# Patient Record
Sex: FEMALE | Race: WHITE | NOT HISPANIC OR LATINO | Employment: FULL TIME | URBAN - METROPOLITAN AREA
[De-identification: names, ages, dates, MRNs, and addresses within clinical notes are randomized per-mention and may not be internally consistent; named-entity substitution may affect disease eponyms.]

---

## 2017-01-12 ENCOUNTER — ALLSCRIPTS OFFICE VISIT (OUTPATIENT)
Dept: OTHER | Facility: OTHER | Age: 52
End: 2017-01-12

## 2017-07-21 DIAGNOSIS — Z12.31 ENCOUNTER FOR SCREENING MAMMOGRAM FOR MALIGNANT NEOPLASM OF BREAST: ICD-10-CM

## 2017-12-28 ENCOUNTER — GENERIC CONVERSION - ENCOUNTER (OUTPATIENT)
Dept: OTHER | Facility: OTHER | Age: 52
End: 2017-12-28

## 2018-01-05 ENCOUNTER — ALLSCRIPTS OFFICE VISIT (OUTPATIENT)
Dept: OTHER | Facility: OTHER | Age: 53
End: 2018-01-05

## 2018-01-05 ENCOUNTER — APPOINTMENT (OUTPATIENT)
Dept: LAB | Facility: HOSPITAL | Age: 53
End: 2018-01-05
Attending: OBSTETRICS & GYNECOLOGY
Payer: COMMERCIAL

## 2018-01-05 DIAGNOSIS — Z12.4 ENCOUNTER FOR SCREENING FOR MALIGNANT NEOPLASM OF CERVIX: ICD-10-CM

## 2018-01-05 DIAGNOSIS — Z12.31 ENCOUNTER FOR SCREENING MAMMOGRAM FOR MALIGNANT NEOPLASM OF BREAST: ICD-10-CM

## 2018-01-05 DIAGNOSIS — J30.9 ALLERGIC RHINITIS: ICD-10-CM

## 2018-01-05 DIAGNOSIS — Z00.00 ENCOUNTER FOR GENERAL ADULT MEDICAL EXAMINATION WITHOUT ABNORMAL FINDINGS: ICD-10-CM

## 2018-01-05 DIAGNOSIS — Z12.11 ENCOUNTER FOR SCREENING FOR MALIGNANT NEOPLASM OF COLON: ICD-10-CM

## 2018-01-05 DIAGNOSIS — J06.9 ACUTE UPPER RESPIRATORY INFECTION: ICD-10-CM

## 2018-01-05 PROCEDURE — G0145 SCR C/V CYTO,THINLAYER,RESCR: HCPCS

## 2018-01-05 PROCEDURE — 87624 HPV HI-RISK TYP POOLED RSLT: CPT

## 2018-01-06 ENCOUNTER — APPOINTMENT (OUTPATIENT)
Dept: LAB | Facility: MEDICAL CENTER | Age: 53
End: 2018-01-06
Payer: COMMERCIAL

## 2018-01-06 ENCOUNTER — TRANSCRIBE ORDERS (OUTPATIENT)
Dept: ADMINISTRATIVE | Facility: HOSPITAL | Age: 53
End: 2018-01-06

## 2018-01-06 DIAGNOSIS — Z00.00 ENCOUNTER FOR GENERAL ADULT MEDICAL EXAMINATION WITHOUT ABNORMAL FINDINGS: ICD-10-CM

## 2018-01-06 LAB
CHOLEST SERPL-MCNC: 183 MG/DL (ref 50–200)
ERYTHROCYTE [DISTWIDTH] IN BLOOD BY AUTOMATED COUNT: 12.3 % (ref 11.6–15.1)
EST. AVERAGE GLUCOSE BLD GHB EST-MCNC: 103 MG/DL
GLUCOSE P FAST SERPL-MCNC: 87 MG/DL (ref 65–99)
HBA1C MFR BLD: 5.2 % (ref 4.2–6.3)
HCT VFR BLD AUTO: 43.3 % (ref 34.8–46.1)
HDLC SERPL-MCNC: 57 MG/DL (ref 40–60)
HGB BLD-MCNC: 14.6 G/DL (ref 11.5–15.4)
LDLC SERPL CALC-MCNC: 115 MG/DL (ref 0–100)
MCH RBC QN AUTO: 32.4 PG (ref 26.8–34.3)
MCHC RBC AUTO-ENTMCNC: 33.7 G/DL (ref 31.4–37.4)
MCV RBC AUTO: 96 FL (ref 82–98)
PLATELET # BLD AUTO: 249 THOUSANDS/UL (ref 149–390)
PMV BLD AUTO: 9.7 FL (ref 8.9–12.7)
RBC # BLD AUTO: 4.51 MILLION/UL (ref 3.81–5.12)
TRIGL SERPL-MCNC: 55 MG/DL
TSH SERPL DL<=0.05 MIU/L-ACNC: 2.5 UIU/ML (ref 0.36–3.74)
WBC # BLD AUTO: 4.58 THOUSAND/UL (ref 4.31–10.16)

## 2018-01-06 PROCEDURE — 80061 LIPID PANEL: CPT

## 2018-01-06 PROCEDURE — 84443 ASSAY THYROID STIM HORMONE: CPT

## 2018-01-06 PROCEDURE — 85027 COMPLETE CBC AUTOMATED: CPT

## 2018-01-06 PROCEDURE — 82947 ASSAY GLUCOSE BLOOD QUANT: CPT

## 2018-01-06 PROCEDURE — 36415 COLL VENOUS BLD VENIPUNCTURE: CPT

## 2018-01-06 PROCEDURE — 83036 HEMOGLOBIN GLYCOSYLATED A1C: CPT

## 2018-01-06 NOTE — PROGRESS NOTES
Assessment   1  Encounter for screening mammogram for malignant neoplasm of breast (V76 12)     (Z12 31)   2  Encounter for preventive health examination (V70 0) (Z00 00)   3  Acute upper respiratory infection (465 9) (J06 9)   4  Allergic rhinitis (477 9) (J30 9)   5  Colon cancer screening (V76 51) (Z12 11)   6  History of self breast exam    Plan   Acute upper respiratory infection, Allergic rhinitis, Colon cancer screening, Health    Maintenance, Encounter for screening mammogram for malignant neoplasm of breast,    History of self breast exam    · MAMMO SCREENING BILATERAL W 3D & CAD; Status:Hold For - Scheduling; Requested    for:2018; Perform:[facility]; TUE:[id number];CPBMXLX; For:Acute upper respiratory infection, Allergic rhinitis, Colon cancer screening, Health Maintenance, Encounter for screening mammogram for malignant neoplasm of breast, History of self breast exam; Ordered By:Jennifer Abdi;  Health Maintenance    · (1) CBC/ PLT (NO DIFF); Status:Active; Requested ZRN:[id number]; Perform:[facility]; [id number];YEXLPCG;EDV:HLRDRW Maintenance; Ordered By:Foerign Abdi;   · (1) GLUCOSE,  FASTING; Status:Active; Requested BFT:[id number]; Perform:[facility]; PAC:[id number];VBOSRWB;FOL:LMFPCD Maintenance; Ordered By:Jennifer Abdi;   · (1) HEMOGLOBIN A1C; Status:Active; Requested GXV:[id number]; Perform:[facility]; KVY:[id number];FVVLGRI;IDI:XEYEEI Maintenance; Ordered By:Foreign Abdi;   · (1) LIPID PANEL, FASTING; Status:Active; Requested XNP:[id number]; Perform:[facility]; NFU:[id number];PJKXYZZ;ISM:XBMDPA Maintenance; Ordered By:Jennifer Abdi;   · (1) TSH; Status:Active; Requested OCL:[id number]; Perform:[facility]; PDH:63NVG7967;DUEJMZD;VAU:FZHMAY Maintenance; Ordered By:Jennifer Abdi;  Pap smear for cervical cancer screening    · (1) THIN PREP PAP WITH IMAGING; Status:Active - Retrospective Authorization;     Requested HCY:[id number]; Perform:[facility]; MNP:[id number]; Last Updated Zoraida Rodriguez; 1/5/2018 9:44:35 AM;Ordered; For:Pap smear for cervical cancer screening; Ordered By:Jennifer Abdi;  Maturation index required? : No  HPV? : Regardless of Interpretation  Perimenopausal symptoms    · Progesterone Micronized 100 MG Oral Capsule; One daily at bedtime   Rx By: Michelle Carranza; Dispense: 30 Days ; #:90 Capsule; Refill: 3;For: Perimenopausal symptoms; ANETA = N; Verified Transmission to [id number]; Last Updated By: System, SureScripts; 1/5/2018 9:43:59 AM  PMH: Hot flashes    · Estradiol 0 025 MG/24HR Transdermal Patch Weekly (Climara); APPLY 1 PATCH    WEEKLY AS DIRECTED   Rx By: Michelle Carranza; Dispense: 0 Days ; #:12 Patch Weekly; Refill: 3;For: PMH: Hot flashes; ANETA = N; Verified Transmission to [id number] Last Updated By: System, SureScripts; 1/5/2018 9:44:00 AM    Discussion/Summary   HPV and Pap Co-testing Done Today Breast cancer screening: the risks and benefits of breast cancer screening were discussed, self breast exam technique was taught, monthly self breast exam was advised and mammogram has been ordered  Advice and education were given regarding aerobic exercise and weight bearing exercise  Preventive labs ordered   symptoms-she will continue HRT, aware of the importance of taking both the estrogen patch and the Prometrium daily  will keep menstrual calendar   diet and exercise and weight loss, encouraged on her weight loss  The patient has the current Goals: See discussion  The patent has the current Barriers: None  Patient is able to Self-Care  Possible side effects of new medications were reviewed with the patient/guardian today  The treatment plan was reviewed with the patient/guardian  The patient/guardian understands and agrees with the treatment plan      Chief Complaint   1   Visit For: Preventive General Multisystem Exam    History of Present Illness   HPI: Patient here for yearly  She got  in November  She is on a low dose Climara patch and Prometrium for vasomotor symptoms  She is technically still karl menopausal  She skips menstrual cycles, her last 1 was 3 months ago  She still has some hot flashes but overall feels well on the HRT  She has been exercising and watching her diet and has lost about 25 lb in the last year, she is frustrated by help slow the weight is coming off  She would like her thyroid Re checked and she is due for preventive labs also  GYN HM, Adult Female [facility]: The patient is being seen for a health maintenance evaluation  The last health maintenance visit was 1 year(s) ago  General Health: The patient's health since the last visit is described as good  Lifestyle:  She has weight concerns  -- She exercises regularly  -- She does not use tobacco     Screening:      Review of Systems        Constitutional: No fever, no chills, feels well, no tiredness, no recent weight gain or loss  ENT: no ear ache, no loss of hearing, no nosebleeds or nasal discharge, no sore throat or hoarseness  Cardiovascular: no complaints of slow or fast heart rate, no chest pain, no palpitations, no leg claudication or lower extremity edema  Respiratory: no complaints of shortness of breath, no wheezing, no dyspnea on exertion, no orthopnea or PND  Breasts: no complaints of breast pain, breast lump or nipple discharge  Gastrointestinal: no complaints of abdominal pain, no constipation, no nausea or diarrhea, no vomiting, no bloody stools  Genitourinary: no complaints of dysuria, no incontinence, no pelvic pain, no dysmenorrhea, no vaginal discharge or abnormal vaginal bleeding  Musculoskeletal: no complaints of arthralgia, no myalgia, no joint swelling or stiffness, no limb pain or swelling  Integumentary: no complaints of skin rash or lesion, no itching or dry skin, no skin wounds        Neurological: no complaints of headache, no confusion, no numbness or tingling, no dizziness or fainting  ROS reviewed  Active Problems   1  Acute upper respiratory infection (465 9) (J06 9)   2  Allergic rhinitis (477 9) (J30 9)   3  Colon cancer screening (V76 51) (Z12 11)   4  Encounter for screening mammogram for malignant neoplasm of breast (V76 12)     (Z12 31)   5  History of self breast exam   6  Perimenopausal symptoms (627 2) (N95 1)    Past Medical History    · History of  0 (V49 89)   · History of amenorrhea (V13 29) (Z87 42)     The active problems and past medical history were reviewed and updated today  Surgical History    · History of Oral Surgery Tooth Extraction   · History of Tonsillectomy     The surgical history was reviewed and updated today  Family History   Father    · Family history of cardiac disorder (V17 49) (Z82 49)   · Family history of myocardial infarction (V17 3) (Z82 49)  Maternal Grandmother    · Family history of arthritis (V17 7) (Z82 61)   · Family history of cardiac disorder (V17 49) (Z82 49)     The family history was reviewed and updated today  Social History    · Caffeine use (V49 89) (F15 90)   · [employer] ([address] 61)   · Does not use birth control   · Denied: History of Drug Use   · Former smoker (E29 53) (K31 271)   · Denied: History of    · Single   · Social alcohol use (Z78 9)   · Uses Safety Equipment - Seatbelts  The social history was reviewed and updated today  Current Meds    1  Estradiol 0 025 MG/24HR Transdermal Patch Weekly; APPLY 1 PATCH WEEKLY AS     DIRECTED; Therapy: 57XYM2971 to (Last Rx:2017)  Requested for: 34RUP9443; Status:     ACTIVE - Renewal Denied Ordered   2  Progesterone Micronized 100 MG Oral Capsule; One daily at bedtime; Therapy: 61XIA1815 to (Evaluate:2017)  Requested for: 98FVH1174; Last     Rx:2017 Ordered    Allergies   1   No Known Drug Allergies    Vitals    Recorded: 40VWL4643 74:44PC   Systolic 361, LUE, Sitting   Diastolic 70, LUE, Sitting   Height 6 ft    Weight 207 lb    BMI Calculated 28 07   BSA Calculated 2 16   LMP THREE MONTHS AGO     Physical Exam        Constitutional      General appearance: No acute distress, well appearing and well nourished  Neck      Thyroid: Normal, no thyromegaly  Pulmonary      Auscultation of lungs: Clear to auscultation  Cardiovascular      Auscultation of heart: Normal rate and rhythm, normal S1 and S2, no murmurs  Genitourinary      External genitalia: Normal and no lesions appreciated  Vagina: Normal, no lesions or dryness appreciated  Urethra: Normal        Urethral meatus: Normal        Bladder: Normal, soft, non-tender and no prolapse or masses appreciated  Cervix: Normal, no palpable masses  Uterus: Normal, non-tender, not enlarged, and no palpable masses  Adnexa/parametria: Normal, non-tender and no fullness or masses appreciated  Anus, perineum, and rectum: Normal sphincter tone, no masses, and no prolapse  Chest      Breasts: Normal and no dimpling or skin changes noted  Abdomen      Abdomen: Normal, non-tender, and no organomegaly noted  Liver and spleen: No hepatomegaly or splenomegaly  Examination for hernias: No hernias appreciated  Psychiatric      Orientation to person, place, and time: Normal        Mood and affect: Normal        Signatures    Electronically signed by :  Stephen Vargas DO; Jan 5 2018  4:12PM EST                       (Author)

## 2018-01-09 ENCOUNTER — GENERIC CONVERSION - ENCOUNTER (OUTPATIENT)
Dept: OTHER | Facility: OTHER | Age: 53
End: 2018-01-09

## 2018-01-09 LAB
HPV RRNA GENITAL QL NAA+PROBE: NORMAL
LAB AP GYN PRIMARY INTERPRETATION: NORMAL
Lab: NORMAL

## 2018-01-10 ENCOUNTER — GENERIC CONVERSION - ENCOUNTER (OUTPATIENT)
Dept: OTHER | Facility: OTHER | Age: 53
End: 2018-01-10

## 2018-01-13 VITALS
DIASTOLIC BLOOD PRESSURE: 60 MMHG | OXYGEN SATURATION: 94 % | HEIGHT: 72 IN | SYSTOLIC BLOOD PRESSURE: 110 MMHG | HEART RATE: 65 BPM

## 2018-01-15 NOTE — MISCELLANEOUS
Message  Return to work or school:   Paul Ansari is under my professional care   She was seen in my office on 1/22/2016             Signatures   Electronically signed by : Bravo Arthur, ; Jan 22 2016  3:35PM EST                       (Author)

## 2018-01-17 NOTE — MISCELLANEOUS
Message  Message Free Text Note Form: pt requests refill on progesterone, sent in for 1 month supply and asked to call the office for further refills  Plan    1  Progesterone Micronized 100 MG Oral Capsule;  One daily at bedtime    Signatures   Electronically signed by : CELSA Wilcox ; Aug 19 2016  5:35PM EST                       (Author)

## 2018-01-23 VITALS
HEIGHT: 72 IN | WEIGHT: 207 LBS | SYSTOLIC BLOOD PRESSURE: 106 MMHG | BODY MASS INDEX: 28.04 KG/M2 | DIASTOLIC BLOOD PRESSURE: 70 MMHG

## 2018-01-23 NOTE — MISCELLANEOUS
Message   Recorded as Task   Date: 01/08/2018 03:21 PM, Created By: Aravind Wolf   Task Name: Go to Result   Assigned To: Yareli Ruelas   Regarding Patient: Laura Solis, Status: Active   Comment:    Aravind Wolf - 08 Jan 2018 3:21 PM     TASK CREATED  labs are good, CBC, TSH, fasting glucose, Hgb A1c all normal, LDL cholesterol slightly elevated, remainder of lipids are good   Gisel Hayes - 09 Jan 2018 9:17 AM     TASK EDITED  pt informed        Active Problems    1  Acute upper respiratory infection (465 9) (J06 9)   2  Allergic rhinitis (477 9) (J30 9)   3  Colon cancer screening (V76 51) (Z12 11)   4  Encounter for screening mammogram for malignant neoplasm of breast (V76 12)   (Z12 31)   5  History of self breast exam   6  Pap smear for cervical cancer screening (V76 2) (Z12 4)   7  Perimenopausal symptoms (627 2) (N95 1)    Current Meds   1  Estradiol 0 025 MG/24HR Transdermal Patch Weekly (Climara); APPLY 1 PATCH   WEEKLY AS DIRECTED; Therapy: 60GQX9903 to (Last Rx:05Jan2018)  Requested for: 92PLV1459 Ordered   2  Progesterone Micronized 100 MG Oral Capsule; One daily at bedtime; Therapy: 80XMU0119 to (Inessa Moore)  Requested for: 71GYA6237; Last   Rx:05Jan2018 Ordered    Allergies    1   No Known Drug Allergies    Signatures   Electronically signed by : Maricel Lopez, ; Jan 9 2018  9:18AM EST                       (Author)

## 2018-01-23 NOTE — MISCELLANEOUS
Message   Recorded as Task   Date: 12/28/2017 02:36 PM, Created By: Melinda Huber   Task Name: Call Back   Assigned To: Melinda Huber   Regarding Patient: Jacob Clemente, Status: Active   CommentGenebrittani Eppster - 28 Dec 2017 2:36 PM     TASK CREATED  Received phone call from Mona at Dr Louisa Grimm office asking me to call patient at [phone] and inform her if she received a Yellow Fever vaccine in 2010 when she was [facility] for [facility]  Called patient, no answer, I left a VM stating she never received the yellow fever vaccine and she could call [phone] with any questions  Active Problems    1  Acute upper respiratory infection (465 9) (J06 9)   2  Allergic rhinitis (477 9) (J30 9)   3  Colon cancer screening (V76 51) (Z12 11)   4  Encounter for screening mammogram for malignant neoplasm of breast (V76 12)   (Z12 31)   5  Perimenopausal symptoms (627 2) (N95 1)    Current Meds   1  Benzonatate 200 MG Oral Capsule; TAKE 1 CAPSULE Every 8 hours; Therapy: 17CAT7345 to (Evaluate:22Jan2017)  Requested for: 29BOH9406; Last   Rx:12Jan2017 Ordered   2  Estradiol 0 025 MG/24HR Transdermal Patch Weekly (Climara); APPLY 1 PATCH   WEEKLY AS DIRECTED; Therapy: 31VXB3853 to (Last Rx:12Oct2017)  Requested for: 12Oct2017; Status: ACTIVE   - Renewal Denied Ordered   3  Progesterone Micronized 100 MG Oral Capsule; One daily at bedtime; Therapy: 83RIG4418 to (Evaluate:10Nov2017)  Requested for: 72OYI6844; Last   Rx:12Oct2017 Ordered    Allergies    1  No Known Drug Allergies    Signatures   Electronically signed by :  Jaswinder Carrera, ; Dec 28 2017  2:37PM EST                       (Author)

## 2018-01-23 NOTE — MISCELLANEOUS
Message   Recorded as Task   Date: 01/09/2018 02:46 PM, Created By: Dahiana Johnson   Task Name: Go to Result   Assigned To: Joel Jacobo   Regarding Patient: Gerardo Zamudio, Status: Active   Comment:    Dahiana Johnson - 09 Jan 2018 2:46 PM     TASK CREATED  nml pap, neg hpv   Gisel Hayes - 10 [address] 2018 7:13 AM     TASK EDITED  normal card mailed        Active Problems    1  Acute upper respiratory infection (465 9) (J06 9)   2  Allergic rhinitis (477 9) (J30 9)   3  Colon cancer screening (V76 51) (Z12 11)   4  Encounter for screening mammogram for malignant neoplasm of breast (V76 12)   (Z12 31)   5  History of self breast exam   6  Pap smear for cervical cancer screening (V76 2) (Z12 4)   7  Perimenopausal symptoms (627 2) (N95 1)    Current Meds   1  Estradiol 0 025 MG/24HR Transdermal Patch Weekly (Climara); APPLY 1 PATCH   WEEKLY AS DIRECTED; Therapy: 22OJJ2597 to (Last Rx:05Jan2018)  Requested for: 90DLX3374 Ordered   2  Progesterone Micronized 100 MG Oral Capsule; One daily at bedtime; Therapy: 52FOS0665 to (Kate Friedman)  Requested for: 79EVD5332; Last   Rx:05Jan2018 Ordered    Allergies    1   No Known Drug Allergies    Signatures   Electronically signed by : Dandre Wakefield, ; Stanton 10 2018  7:14AM EST                       (Author)

## 2018-01-30 ENCOUNTER — OFFICE VISIT (OUTPATIENT)
Dept: INTERNAL MEDICINE CLINIC | Facility: CLINIC | Age: 53
End: 2018-01-30
Payer: COMMERCIAL

## 2018-01-30 VITALS
BODY MASS INDEX: 28.04 KG/M2 | HEART RATE: 79 BPM | DIASTOLIC BLOOD PRESSURE: 80 MMHG | SYSTOLIC BLOOD PRESSURE: 116 MMHG | WEIGHT: 207 LBS | OXYGEN SATURATION: 97 % | HEIGHT: 72 IN

## 2018-01-30 DIAGNOSIS — Z12.11 COLON CANCER SCREENING: Primary | ICD-10-CM

## 2018-01-30 DIAGNOSIS — M54.50 ACUTE MIDLINE LOW BACK PAIN WITHOUT SCIATICA: ICD-10-CM

## 2018-01-30 DIAGNOSIS — E88.81 INSULIN RESISTANCE: ICD-10-CM

## 2018-01-30 DIAGNOSIS — Z86.39 HISTORY OF BEING OBESE: ICD-10-CM

## 2018-01-30 PROCEDURE — 99214 OFFICE O/P EST MOD 30 MIN: CPT | Performed by: INTERNAL MEDICINE

## 2018-01-30 RX ORDER — METFORMIN HYDROCHLORIDE EXTENDED-RELEASE TABLETS 500 MG/1
TABLET, FILM COATED, EXTENDED RELEASE ORAL
Qty: 30 TABLET | Refills: 0 | Status: SHIPPED | OUTPATIENT
Start: 2018-01-30 | End: 2018-01-30 | Stop reason: SDUPTHER

## 2018-01-30 RX ORDER — METFORMIN HYDROCHLORIDE EXTENDED-RELEASE TABLETS 500 MG/1
TABLET, FILM COATED, EXTENDED RELEASE ORAL
Qty: 30 TABLET | Refills: 0 | Status: SHIPPED | OUTPATIENT
Start: 2018-01-30 | End: 2018-02-14

## 2018-01-30 NOTE — LETTER
January 30, 2018     Patient: Michel Han   YOB: 1965   Date of Visit: 1/30/2018       To Whom it May Concern:    Anhcrescencio Baig is under my professional care  She was seen in my office on 1/30/2018  She may return to work on 1/31/2018  If you have any questions or concerns, please don't hesitate to call           Sincerely,          Bailee Bryant MD        CC: No Recipients

## 2018-01-30 NOTE — PROGRESS NOTES
Assessment/Plan:    No problem-specific Assessment & Plan notes found for this encounter  Problem List Items Addressed This Visit     None      Visit Diagnoses     Colon cancer screening    -  Primary    Relevant Medications    metFORMIN (FORTAMET) 500 MG (OSM) 24 hr tablet    Other Relevant Orders    Ambulatory referral to Gastroenterology    History of being obese        Relevant Orders    Ambulatory referral to Weight Management    Acute midline low back pain without sciatica              Patient Instructions     Acute Low Back Pain   AMBULATORY CARE:   Acute low back pain  is sudden discomfort in your lower back area that lasts for up to 6 weeks  The discomfort makes it difficult to tolerate activity  Common symptoms include the following:   · Back stiffness or spasms    · Pain down the back or side of one leg    · Holding yourself in an unusual position or posture to decrease your back pain    · Not being able to find a sitting position that is comfortable    · Slow increase in your pain for 24 to 48 hours after you stress your back    · Tenderness on your lower back or severe pain when you move your back  Seek immediate care for the following symptoms:   · Severe pain    · Sudden stiffness and heaviness in both buttocks down to both legs    · Numbness or weakness in one leg, or pain in both legs    · Numbness in your genital area or across your lower back    · Unable to control your urine or bowel movements  Contact your healthcare provider if:   · You have a fever  · You have pain at night or when you rest     · Your pain does not get better with treatment  · You have pain that worsens when you cough or sneeze  · You suddenly feel something pop or snap in your back  · You have questions or concerns about your condition or care  The goal of treatment for acute low back pain  is to relieve your pain and help you tolerate activity   Most people with acute lower back pain get better within 4 to 6 weeks  You may need any of the following:  · Acetaminophen  decreases pain  It is available without a doctor's order  Ask how much to take and how often to take it  Follow directions  Acetaminophen can cause liver damage if not taken correctly  · NSAIDs  help decrease swelling and pain  This medicine is available with or without a doctor's order  NSAIDs can cause stomach bleeding or kidney problems in certain people  If you take blood thinner medicine, always ask your healthcare provider if NSAIDs are safe for you  Always read the medicine label and follow directions  · Prescription pain medicine  may be given  Ask your healthcare provider how to take this medicine safely  · Muscle relaxers  decrease pain by relaxing the muscles in your lower spine  Manage your symptoms:   · Stay active  as much as you can without causing more pain  Bed rest could make your back pain worse  Start with some light exercises such as walking  Avoid heavy lifting until your pain is gone  Ask for more information about the activities or exercises that are right for you  · Ice  helps decrease swelling, pain, and muscle spams  Put crushed ice in a plastic bag  Cover it with a towel  Place it on your lower back for 20 to 30 minutes every 2 hours  Do this for about 2 to 3 days after your pain starts, or as directed  · Heat  helps decrease pain and muscle spasms  Start to use heat after treatment with ice has stopped  Use a small towel dampened with warm water or a heating pad, or sit in a warm bath  Apply heat on the area for 20 to 30 minutes every 2 hours for as many days as directed  Alternate heat and ice  Prevent acute low back pain:   · Use proper body mechanics  ¨ Bend at the hips and knees when you  objects  Do not bend from the waist  Use your leg muscles as you lift the load  Do not use your back  Keep the object close to your chest as you lift it   Try not to twist or lift anything above your waist     ¨ Change your position often when you stand for long periods of time  Rest one foot on a small box or footrest, and then switch to the other foot often  ¨ Try not to sit for long periods of time  When you do, sit in a straight-backed chair with your feet flat on the floor  Never reach, pull, or push while you are sitting  · Do exercises that strengthen your back muscles  Warm up before you exercise  Ask your healthcare provider the best exercises for you  · Maintain a healthy weight  Ask your healthcare provider how much you should weigh  Ask him to help you create a weight loss plan if you are overweight  Follow up with your healthcare provider as directed:  Return for a follow-up visit if you still have pain after 1 to 3 weeks of treatment  You may need to visit an orthopedist if your back pain lasts more than 12 weeks  Write down your questions so you remember to ask them during your visits  © 2017 [facility] Information is for End User's use only and may not be sold, redistributed or otherwise used for commercial purposes  All illustrations and images included in CareNotes® are the copyrighted property of A D A M , Inc  or [address]  The above information is an  only  It is not intended as medical advice for individual conditions or treatments  Talk to your doctor, nurse or pharmacist before following any medical regimen to see if it is safe and effective for you  Subjective:      Patient ID: Michael Arteaga is a 50 y o  female  Back Pain   This is a new problem  The current episode started in the past 7 days  The problem occurs constantly  The problem is unchanged  The pain is present in the sacro-iliac and gluteal  The pain radiates to the left knee and left thigh  The pain is moderate  The symptoms are aggravated by lying down  Stiffness is present all day  Associated symptoms include leg pain   Pertinent negatives include no abdominal pain, bladder incontinence, bowel incontinence, fever, numbness, paresthesias, pelvic pain, perianal numbness or weakness  She has tried nothing for the symptoms  The treatment provided no relief  Leg Pain    Pertinent negatives include no numbness  The following portions of the patient's history were reviewed and updated as appropriate: allergies, current medications, past family history, past medical history, past social history, past surgical history and problem list     Review of Systems   Constitutional: Negative for fever and unexpected weight change  Gastrointestinal: Negative for abdominal pain and bowel incontinence  Negative stool incontinence   Genitourinary: Negative for bladder incontinence, difficulty urinating and pelvic pain  Musculoskeletal: Positive for back pain  Neurological: Negative for weakness, numbness and paresthesias  Objective:  Blood pressure 116/80, pulse 79, height 6' (1 829 m), weight 93 9 kg (207 lb), SpO2 97 %  Physical Exam   Constitutional: She appears well-developed and well-nourished  Musculoskeletal: Normal range of motion  She exhibits no edema  Minimal tenderness sacral prominence positive tenderness greater trochanter   Good range of motion    Neurological:   Gait is normal

## 2018-01-30 NOTE — PATIENT INSTRUCTIONS
Acute Low Back Pain   AMBULATORY CARE:   Acute low back pain  is sudden discomfort in your lower back area that lasts for up to 6 weeks  The discomfort makes it difficult to tolerate activity  Common symptoms include the following:   · Back stiffness or spasms    · Pain down the back or side of one leg    · Holding yourself in an unusual position or posture to decrease your back pain    · Not being able to find a sitting position that is comfortable    · Slow increase in your pain for 24 to 48 hours after you stress your back    · Tenderness on your lower back or severe pain when you move your back  Seek immediate care for the following symptoms:   · Severe pain    · Sudden stiffness and heaviness in both buttocks down to both legs    · Numbness or weakness in one leg, or pain in both legs    · Numbness in your genital area or across your lower back    · Unable to control your urine or bowel movements  Contact your healthcare provider if:   · You have a fever  · You have pain at night or when you rest     · Your pain does not get better with treatment  · You have pain that worsens when you cough or sneeze  · You suddenly feel something pop or snap in your back  · You have questions or concerns about your condition or care  The goal of treatment for acute low back pain  is to relieve your pain and help you tolerate activity  Most people with acute lower back pain get better within 4 to 6 weeks  You may need any of the following:  · Acetaminophen  decreases pain  It is available without a doctor's order  Ask how much to take and how often to take it  Follow directions  Acetaminophen can cause liver damage if not taken correctly  · NSAIDs  help decrease swelling and pain  This medicine is available with or without a doctor's order  NSAIDs can cause stomach bleeding or kidney problems in certain people   If you take blood thinner medicine, always ask your healthcare provider if NSAIDs are safe for you  Always read the medicine label and follow directions  · Prescription pain medicine  may be given  Ask your healthcare provider how to take this medicine safely  · Muscle relaxers  decrease pain by relaxing the muscles in your lower spine  Manage your symptoms:   · Stay active  as much as you can without causing more pain  Bed rest could make your back pain worse  Start with some light exercises such as walking  Avoid heavy lifting until your pain is gone  Ask for more information about the activities or exercises that are right for you  · Ice  helps decrease swelling, pain, and muscle spams  Put crushed ice in a plastic bag  Cover it with a towel  Place it on your lower back for 20 to 30 minutes every 2 hours  Do this for about 2 to 3 days after your pain starts, or as directed  · Heat  helps decrease pain and muscle spasms  Start to use heat after treatment with ice has stopped  Use a small towel dampened with warm water or a heating pad, or sit in a warm bath  Apply heat on the area for 20 to 30 minutes every 2 hours for as many days as directed  Alternate heat and ice  Prevent acute low back pain:   · Use proper body mechanics  ¨ Bend at the hips and knees when you  objects  Do not bend from the waist  Use your leg muscles as you lift the load  Do not use your back  Keep the object close to your chest as you lift it  Try not to twist or lift anything above your waist     ¨ Change your position often when you stand for long periods of time  Rest one foot on a small box or footrest, and then switch to the other foot often  ¨ Try not to sit for long periods of time  When you do, sit in a straight-backed chair with your feet flat on the floor  Never reach, pull, or push while you are sitting  · Do exercises that strengthen your back muscles  Warm up before you exercise  Ask your healthcare provider the best exercises for you  · Maintain a healthy weight    Ask your healthcare provider how much you should weigh  Ask him to help you create a weight loss plan if you are overweight  Follow up with your healthcare provider as directed:  Return for a follow-up visit if you still have pain after 1 to 3 weeks of treatment  You may need to visit an orthopedist if your back pain lasts more than 12 weeks  Write down your questions so you remember to ask them during your visits  © 2017 [employer] Information is for End User's use only and may not be sold, redistributed or otherwise used for commercial purposes  All illustrations and images included in CareNotes® are the copyrighted property of [employer]  or [employer]  The above information is an  only  It is not intended as medical advice for individual conditions or treatments  Talk to your doctor, nurse or pharmacist before following any medical regimen to see if it is safe and effective for you

## 2018-01-30 NOTE — LETTER
January 30, 2018     Patient: Israel Shah   YOB: 1965   Date of Visit: 1/30/2018       To Whom it May Concern:    Eusebio Sherwood is under my professional care  She was seen in my office on 1/30/2018  She may return to work on 1/31/2018  If you have any questions or concerns, please don't hesitate to call           Sincerely,          Mayi Marie MD        CC: Israel Shah

## 2018-02-06 ENCOUNTER — TELEPHONE (OUTPATIENT)
Dept: INTERNAL MEDICINE CLINIC | Facility: CLINIC | Age: 53
End: 2018-02-06

## 2018-02-14 ENCOUNTER — OFFICE VISIT (OUTPATIENT)
Dept: BARIATRICS | Facility: CLINIC | Age: 53
End: 2018-02-14
Payer: COMMERCIAL

## 2018-02-14 VITALS
TEMPERATURE: 98.7 F | HEIGHT: 72 IN | RESPIRATION RATE: 16 BRPM | SYSTOLIC BLOOD PRESSURE: 104 MMHG | WEIGHT: 213.4 LBS | DIASTOLIC BLOOD PRESSURE: 64 MMHG | BODY MASS INDEX: 28.91 KG/M2 | HEART RATE: 72 BPM

## 2018-02-14 DIAGNOSIS — R63.5 ABNORMAL WEIGHT GAIN: Primary | ICD-10-CM

## 2018-02-14 DIAGNOSIS — E66.3 OVERWEIGHT: ICD-10-CM

## 2018-02-14 DIAGNOSIS — R73.01 IMPAIRED FASTING GLUCOSE: ICD-10-CM

## 2018-02-14 PROCEDURE — 99204 OFFICE O/P NEW MOD 45 MIN: CPT | Performed by: INTERNAL MEDICINE

## 2018-02-14 NOTE — PROGRESS NOTES
Assessment/Plan:    Abnormal weight gain  -Discussed options of HealthyCORE-Intensive Lifestyle Intervention Program, Very Low Calorie Diet-VLCD and Conservative Program and the role of weight loss medications   -Initial weight loss goal of 5-10% weight loss for improved health  -Screening labs-completed and within acceptable limits    -Patient is interested in pursuing very low-calorie diet        Impaired fasting glucose  Patient was started on this 2 weeks ago  Based on recent lab testing A1c and glucose levels normal   Do not see insulin level  Although this condition may exist, would like to have confirmatory testing prior to patient continuing metformin long term and subjects patient to potential side effects  Since she is starting very low-calorie diet and likely normalization of glucose and insulin levels will occur would like to repeat A1c fasting glucose and fasting insulin levels in about 3 months  Advised to stop metformin        Follow up with Non-Surgical Dietician to start VLCD  [facility] and all orders for this visit:    Abnormal weight gain    Overweight    Impaired fasting glucose          Subjective:   Chief Complaint   Patient presents with    Consult     Patient here for [facility] consult  Patient ID: Stella Ku  is a 50 y o  female with excess weight here to pursue weight managment  has a past medical history of Acute upper respiratory infection; Allergic rhinitis; Amenorrhea; Obesity; and Perimenopausal symptoms      HPI:  Obesity/Excess Weight:  Severity: Mild  Onset:  Past few years    Modifiers: Diet and Exercise  Contributing factors: Poor Food Choices, Insufficient Physical Activity and Stress/Emotional Eating  Associated symptoms: body image issues    Goals:180    The following portions of the patient's history were reviewed and updated as appropriate: allergies, current medications, past family history, past medical history, past social history, past surgical history and problem list     Review of Systems    Objective:     Physical Exam   Nursing note and vitals reviewed  45 minute visit, >50% time spent counseling patient on nonsurgical interventions for the treatment of excess weight  Discussed in detail nonsurgical options including intensive lifestyle intervention program, very low-calorie diet program and conservative program   Discussed the role of weight loss medications  Counseled patient on diet behavior and exercise modification for weight loss

## 2018-02-14 NOTE — ASSESSMENT & PLAN NOTE
Patient was started on this 2 weeks ago  Based on recent lab testing A1c and glucose levels normal   Do not see insulin level  Although this condition may exist, would like to have confirmatory testing prior to patient continuing metformin long term and subjects patient to potential side effects  Since she is starting very low-calorie diet and likely normalization of glucose and insulin levels will occur would like to repeat A1c fasting glucose and fasting insulin levels in about 3 months    Advised to stop metformin

## 2018-02-14 NOTE — ASSESSMENT & PLAN NOTE
-Discussed options of HealthyCORE-Intensive Lifestyle Intervention Program, Very Low Calorie Diet-VLCD and Conservative Program and the role of weight loss medications   -Initial weight loss goal of 5-10% weight loss for improved health  -Screening labs-completed and within acceptable limits    -Patient is interested in pursuing very low-calorie diet

## 2018-02-16 ENCOUNTER — TELEPHONE (OUTPATIENT)
Dept: INTERNAL MEDICINE CLINIC | Facility: CLINIC | Age: 53
End: 2018-02-16

## 2018-02-16 ENCOUNTER — OFFICE VISIT (OUTPATIENT)
Dept: BARIATRICS | Facility: CLINIC | Age: 53
End: 2018-02-16

## 2018-02-16 VITALS — WEIGHT: 215.2 LBS | BODY MASS INDEX: 29.15 KG/M2 | HEIGHT: 72 IN

## 2018-02-16 DIAGNOSIS — R63.5 ABNORMAL WEIGHT GAIN: ICD-10-CM

## 2018-02-16 PROCEDURE — RECHECK: Performed by: DIETITIAN, REGISTERED

## 2018-02-16 PROCEDURE — VLCD: Performed by: DIETITIAN, REGISTERED

## 2018-02-16 NOTE — PROGRESS NOTES
Reviewed VLCD diet principles  Developed meal plan and reviewed product use  Ordered 2 week supply  Gave patient written VLCD education packet and left patient contact number  Will call contact patient in 2-3 days to assess tolerance

## 2018-02-26 ENCOUNTER — PATIENT OUTREACH (OUTPATIENT)
Dept: BARIATRICS | Facility: CLINIC | Age: 53
End: 2018-02-26

## 2018-02-27 NOTE — PROGRESS NOTES
Weight Management Medical Nutrition Assessment  Darylene Leatherwood presented for a VLCD follow-up session  Today's weight is 208 6#   She has lost 6 6# in the past 11 days  She states she is tolerating meal replacements without difficulty  Consuming at least 80oz of water in addition to the the water used to mix replacements  Patient not experiencing constipation - taking SF gummies  She stated she still struggles with the urge to snack in the evening  She would like to follow the VLCD diet for 1 more week and then transition to a Low Calorie diet the second week  Meal plan for second week reviewed  Anthropometric Measurements  Start Weight (lbs): 215 2#  Current Weight (lbs): 208 6#  Ideal Body Weight (lbs):160#  Goal Weight (lbs):under 200#  LT#    Weight Loss History  Previous weight loss attempts: Exercise  Self Created Diets (Portion Control, Healthy Food Choices, etc )    Food and Nutrition Related History  Meal Plan  8am   Breakfast:Shake / coffee    Snack:  11am    Lunch:Pudding  3pm      Snack:Bar  6-7pm   Dinner:Soup  9-10pm Snack:SF popsicle ( 15 calories) - trouble area     Beverages: water and decaf coffee/tea  Volume of beverage intake: 80 oz   Weekends: Same    Cravings: meatballs  Frequency of Eating out: irregularly  Food restrictions:none  Cooking:patient /   Food Shopping:patient /     Physical Activity Intake  Activity:strength train 2x   Frequency:occasionally  Physical limitations/barriers to exercise: none    Estimated Needs  Energy  Bear Cuate Energy Needs: BMR: 1678 calories 1-2# loss sedentary: 976-1476 calories lightly active: 1106-0790 calories  Protein: 88-109gm (1 2-1 5g/kg IBW)  Fluid: 85oz(35mL/kg IBW)    Nutrition Diagnosis  Yes; Overweight/obesity  related to Excess energy intake as evidenced by  BMI more than normative standard for age and sex (overweight 25-29  9)       Nutrition Intervention  Meal Plan - Low Calorie diet  Breakfast:Shake  Snack:150  Lunch:Shake  Snack: 150  Dinner:300-400/30/30  Snack:150    Nutrition Education: VLCD Manual/ Low Calorie Meal Plan    Nutrition Counseling:  Strategies: meal planning, portion sizes, healthy snack choices, hydration, fiber intake, protein intake, exercise, food journal      Monitoring and Evaluation:  Evaluation criteria:  Hydration: 80oz daily   Meal planning/preparation  Energy intake: VCLD for 1 week then transition to 7368-1927 calorie meal plan  Food Journal daily   Portions: reduce at meal and snack time  Exercise: 30 minutes 3-5 x weekly    Barriers to learning:none  Readiness to change: Action  Comprehension: good  Expected Compliance: good

## 2018-02-28 ENCOUNTER — OFFICE VISIT (OUTPATIENT)
Dept: BARIATRICS | Facility: CLINIC | Age: 53
End: 2018-02-28

## 2018-02-28 VITALS
SYSTOLIC BLOOD PRESSURE: 120 MMHG | BODY MASS INDEX: 28.25 KG/M2 | DIASTOLIC BLOOD PRESSURE: 76 MMHG | HEIGHT: 72 IN | WEIGHT: 208.6 LBS

## 2018-02-28 DIAGNOSIS — R63.5 ABNORMAL WEIGHT GAIN: ICD-10-CM

## 2018-02-28 PROCEDURE — RECHECK: Performed by: DIETITIAN, REGISTERED

## 2018-02-28 PROCEDURE — VLCD: Performed by: DIETITIAN, REGISTERED

## 2018-03-20 ENCOUNTER — OFFICE VISIT (OUTPATIENT)
Dept: BARIATRICS | Facility: CLINIC | Age: 53
End: 2018-03-20

## 2018-03-20 VITALS — WEIGHT: 206 LBS | HEIGHT: 72 IN | BODY MASS INDEX: 27.9 KG/M2

## 2018-03-20 DIAGNOSIS — R63.5 ABNORMAL WEIGHT GAIN: ICD-10-CM

## 2018-03-20 PROCEDURE — RECHECK: Performed by: DIETITIAN, REGISTERED

## 2018-03-20 PROCEDURE — VLCD: Performed by: DIETITIAN, REGISTERED

## 2018-03-20 NOTE — PROGRESS NOTES
Weight Management Medical Nutrition Assessment  Maile Matthews presented for a [facility] follow-up session  Today's weight is 206#  She has lost 2 6# in the past 2 weeks and overall has lost 9 2# in the past month  Tolerating meal replacements without difficulty  Consuming at least 80oz of water in addition to the the water used to mix replacements  Patient not experiencing constipation  She converted to a low calorie diet the past week using meal replacements during the day and a sensible meal a day  Her recall reveals she is often very low on calories during the day  She is not formally food journaling and recommended her begin so we could assess if she is meeting her calorie and protein needs  Anthropometric Measurements  Start Weight (lbs): 215 2#  Current Weight (lbs): 206  TBW % Change from start weight:4 2%  Ideal Body Weight (lbs):160#  Goal Weight (lbs):under 200# then 180#    Weight Loss History  Previous weight loss attempts: Commercial Programs (Weight Watchers, [employer], etc )  Self Created Diets (Portion Control, Healthy Food Choices, etc )    Food and Nutrition Related History      Meal Plan  Breakfast: Shake with  Snack:2 cheese sticks  Veggies   pickle  Lunch:skip  Snack: bar   Dinner:meal ground with lettuce wrap  Snack:popsicles      Beverages: water  Volume of beverage intake: 70-80oz    Weekends: Same  Cravings: none  Trouble area of day:night    Frequency of Eating out: irregularly  Food restrictions:none  Cooking: self   Food Shopping: self    Physical Activity Intake  Activity:Walking  Frequency:2 -3 x week   Physical limitations/barriers to exercise: none    Estimated Needs  Energy  Bear [place]uate Energy Needs: BMR : 4673   2# loss sedentary:  1000 calories              Lightly active:1291 calories  Protein:88-109gm      (1 2-1 5g/kg IBW)  Fluid: 85oz     (35mL/kg IBW)    Nutrition Diagnosis  Yes;     Overweight/obesity  related to Excess energy intake as evidenced by  BMI more than normative standard for age and sex (overweight 25-29  9)       Nutrition Intervention  Meal Plan  Breakfast:  Snack:  Lunch:  Snack  Dinner:  Snack:    Nutrition Education:   Healthy Core Manual  Calorie controlled menu  Lean protein food choices  Healthy snack options  Food journaling tips      Nutrition Counseling:  Strategies: meal planning, portion sizes, healthy snack choices, hydration, fiber intake, protein intake, exercise, food journal      Monitoring and Evaluation:  Evaluation criteria:  Energy Intake:6916-4033 on sedentary days and flex to 0720-6811 calories on waking days  Meet protein needs: 88-110grams daily   Maintain adequate hydration  Monitor weekly weight  Meal planning/preparation  Food journal   Portions at mealtimes and snacks  Physical activity     Barriers to learning:none  Readiness to change: Action  Comprehension: good  Expected Compliance: good

## 2018-04-04 ENCOUNTER — OFFICE VISIT (OUTPATIENT)
Dept: BARIATRICS | Facility: CLINIC | Age: 53
End: 2018-04-04

## 2018-04-04 VITALS — WEIGHT: 203.3 LBS | BODY MASS INDEX: 27.54 KG/M2 | HEIGHT: 72 IN

## 2018-04-04 DIAGNOSIS — R63.5 ABNORMAL WEIGHT GAIN: ICD-10-CM

## 2018-04-04 PROCEDURE — RECHECK: Performed by: DIETITIAN, REGISTERED

## 2018-04-04 PROCEDURE — WMDI30: Performed by: DIETITIAN, REGISTERED

## 2018-04-04 NOTE — PROGRESS NOTES
Weight Management Medical Nutrition Assessment  Bianca Snow presented for a meal planning session  Today's weight is 203 3#  She has lost 2 7# in the past 2 weeks  and overall she has lost 11 9# (5 5% TBW) in the past 6 weeks  She recently returned form a vacation and is pleased she lost  She has made many dietary changes including interval eating, reducing portion sizes at snacks and meals, and food journaling  Food journal 1200 calories daily - feels good on that level  She is using 1-2 meal replacements daily  Anthropometric Measurements  Start Weight (lbs): 215 2#  Current Weight (lbs): 203 3#  TBW % Change from start weight:  Ideal Body Weight (lbs):160#  Goal Weight (lbs):under 200# then 180#    Weight Loss History  Previous weight loss attempts: Self Created Diets (Portion Control, Healthy Food Choices, etc )    Food and Nutrition Related History    Meal Plan  Breakfast: Shake    Snack:veggies  Lunch:300 calories  Snack:Cheese stick  Dinner:Soup  Snack:      Beverages: water  Volume of beverage intake: >64oz    Weekends: Same  Cravings: none  Trouble area of day:night    Frequency of Eating out: daily  Food restrictions:  Cooking: self   Food Shopping: self    Physical Activity Intake  Activity:Walking  Frequency:weekly 2-3 x   Physical limitations/barriers to exercise: none    Estimated Needs  Energy  Bear [place]uate Energy Needs: BMR : 1646 calories   1-2# loss sedentary:  976-1475 calories             Lightly active:   9093-9688 calories   Protein:88-109gm      (1 2-1 5g/kg IBW)  Fluid: 85oz     (35mL/kg IBW)    Nutrition Diagnosis  Yes; Overweight/obesity  related to Excess energy intake as evidenced by  BMI more than normative standard for age and sex (overweight 25-29  9)       Nutrition Intervention    Nutrition Education:     Calorie controlled menu  Lean protein food choices  Healthy snack options  Food journaling tips      Nutrition Counseling:  Strategies: meal planning, portion sizes, healthy snack choices, hydration, fiber intake, protein intake, exercise, food journal      Monitoring and Evaluation:  Evaluation criteria:  Energy Intake:1200 calories on sedentary days and 1400 calories on exercise days  Meet protein needs  Maintain adequate hydration  Monitor weekly weight  Meal planning/preparation  Food journal   Portions at mealtimes and snacks  Physical activity     Barriers to learning:none  Readiness to change: Action  Comprehension: good  Expected Compliance: good

## 2018-04-18 ENCOUNTER — OFFICE VISIT (OUTPATIENT)
Dept: BARIATRICS | Facility: CLINIC | Age: 53
End: 2018-04-18

## 2018-04-18 VITALS — HEIGHT: 72 IN | WEIGHT: 201.7 LBS | BODY MASS INDEX: 27.32 KG/M2

## 2018-04-18 DIAGNOSIS — R63.5 ABNORMAL WEIGHT GAIN: ICD-10-CM

## 2018-04-18 PROCEDURE — RECHECK: Performed by: DIETITIAN, REGISTERED

## 2018-04-18 PROCEDURE — WMDI30: Performed by: DIETITIAN, REGISTERED

## 2018-04-18 NOTE — PROGRESS NOTES
Weight Management Medical Nutrition Assessment  Chito De La Garza presented for a meal planning session  Today's weight is 201 7#  She has lost 1 6# in the past month and overall she has lost 13 5# (6 2% TBW)  She stated she has been very busy with work and that has affected her exercise time  She has made many dietary changes including interval eating, reducing portion sizes at snacks and meals  She is meeting her protein and fluid needs  Using 1 meal replacement and 1 bar daily  She is not formally food journaling  Discussed increasing her physical activity  Anthropometric Measurements  Start Weight (lbs): 213 4#  Current Weight (lbs): 201 7#  TBW % Change from start weight:6 2%  Ideal Body Weight (lbs):160#  Goal Weight (lbs):under 200 # 185#     Weight Loss History  Previous weight loss attempts: Self Created Diets (Portion Control, Healthy Food Choices, etc )    Food and Nutrition Related History      Meal Plan  Breakfast:Shake, water,coffee   Snack:bell pepper / cheese stick/ tomato  Lunch:crunchy chick pea   Snack:protein bar  Dinner:lean protein/ veggies/ carb   Snack:skinny pop/ four popsicle       Beverages: water  Volume of beverage intake: 80oz    Weekends: Same eat more regular food  Cravings: smell is trigger  Trouble area of day:between meals    Frequency of Eating out: weekends  Food restrictions:none  Cooking: self   Food Shopping: self    Physical Activity Intake  Activity:treadmill 1-2x week  Plan to walk   Frequency:2-x week   Physical limitations/barriers to exercise: none    Estimated Needs  Energy  Bear [employer] Energy Needs: BMR : 7299 calories   1-2# loss sedentary:  976-1476 calories             Lightly active:6886-7215 calories     Protein:88-109gm      (1 2-1 5g/kg IBW)  Fluid: 85oz     (35mL/kg IBW)    Nutrition Diagnosis  Yes; Overweight/obesity  related to Excess energy intake as evidenced by  BMI more than normative standard for age and sex (overweight 25-29  9)       Nutrition Intervention  Meal Plan  Breakfast:  Snack:  Lunch:  Snack  Dinner:  Snack:    Nutrition Education:   Healthy Core Manual  Calorie controlled menu  Lean protein food choices  Healthy snack options  Food journaling tips      Nutrition Counseling:  Strategies: meal planning, portion sizes, healthy snack choices, hydration, fiber intake, protein intake, exercise, food journal      Monitoring and Evaluation:  Evaluation criteria:  Energy Intake:1200 calories on sedentary days and 1400 calories on active days  Meet protein needs  Maintain adequate hydration  Monitor weekly weight  Meal planning/preparation  Food journal   Portions at mealtimes and snacks  Physical activity     Barriers to learning:none  Readiness to change: Action  Comprehension: good  Expected Compliance: good

## 2018-05-02 ENCOUNTER — OFFICE VISIT (OUTPATIENT)
Dept: GASTROENTEROLOGY | Facility: MEDICAL CENTER | Age: 53
End: 2018-05-02
Payer: COMMERCIAL

## 2018-05-02 VITALS
SYSTOLIC BLOOD PRESSURE: 120 MMHG | HEART RATE: 60 BPM | TEMPERATURE: 97.5 F | DIASTOLIC BLOOD PRESSURE: 70 MMHG | HEIGHT: 71 IN | WEIGHT: 207 LBS | BODY MASS INDEX: 28.98 KG/M2

## 2018-05-02 DIAGNOSIS — K21.9 GASTROESOPHAGEAL REFLUX DISEASE WITHOUT ESOPHAGITIS: Primary | ICD-10-CM

## 2018-05-02 DIAGNOSIS — Z12.11 COLON CANCER SCREENING: ICD-10-CM

## 2018-05-02 PROCEDURE — 99203 OFFICE O/P NEW LOW 30 MIN: CPT | Performed by: INTERNAL MEDICINE

## 2018-05-02 NOTE — LETTER
May 2, 2018     Jody Abrams MD  [address] [place] [address]    Patient: Darrell Pandya   YOB: 1965   Date of Visit: 5/2/2018       Dear Dr Brooks : Thank you for referring Chayarick Lutz to me for evaluation  Below are my notes for this consultation  If you have questions, please do not hesitate to call me  I look forward to following your patient along with you  Sincerely,        Leola Aldridge MD        CC: No Recipients  Leola Aldridge MD  5/2/2018  9:09 AM  Sign at close encounter  [facility] - Outpatient Consultation  Darrell Pandya 50 y o  female MRN: [id number]  Encounter: [id number]          ASSESSMENT AND PLAN:      1  Colon cancer screening-will be scheduled for her index colonoscopy  She is average risk without any family history of colorectal cancer  Discussed risk and benefit of the procedure  - Case request operating room: COLONOSCOPY; Standing    2  Gastroesophageal reflux disease without esophagitis-stable with rare episodes  No further intervention required  Symptoms are triggered by food triggers  ______________________________________________________________________    HPI:      She is a very pleasant 65-year-old female (works as a ) presents here for her screening colonoscopy  Denies any family history of colorectal cancer  Otherwise overall doing well  She does have history of GERD but well controlled  REVIEW OF SYSTEMS:    CONSTITUTIONAL: Denies any fever, chills, rigors, and weight loss  HEENT: No earache or tinnitus  Denies hearing loss or visual disturbances  CARDIOVASCULAR: No chest pain or palpitations  RESPIRATORY: Denies any cough, hemoptysis, shortness of breath or dyspnea on exertion  GASTROINTESTINAL: As noted in the History of Present Illness  GENITOURINARY: No problems with urination  Denies any hematuria or dysuria    NEUROLOGIC: No dizziness or vertigo, denies headaches  MUSCULOSKELETAL: Denies any muscle or joint pain  SKIN: Denies skin rashes or itching  ENDOCRINE: Denies excessive thirst  Denies intolerance to heat or cold  PSYCHOSOCIAL: Denies depression or anxiety  Denies any recent memory loss  Historical Information   Past Medical History:   Diagnosis Date    Acute upper respiratory infection     Allergic rhinitis     Amenorrhea     Obesity     Perimenopausal symptoms      Past Surgical History:   Procedure Laterality Date    MOUTH SURGERY      TONSILLECTOMY       Social History   History   Alcohol Use    Yes     History   Drug Use No     History   Smoking Status    Former Smoker   Smokeless Tobacco    Current User     Family History   Problem Relation Age of Onset    Heart disease Father     Heart attack Father     Arthritis Maternal Grandmother     Heart disease Maternal Grandmother     No Known Problems Mother      Well       Meds/Allergies       Current Outpatient Prescriptions:     estradiol (CLIMARA) 0 025 mg/24 hr    progesterone (PROMETRIUM) 100 MG capsule    No Known Allergies        Objective     Blood pressure 120/70, pulse 60, temperature 97 5 °F (36 4 °C), temperature source Tympanic, height 5' 11" (1 803 m), weight 93 9 kg (207 lb)  Body mass index is 28 87 kg/m²  PHYSICAL EXAM:      General Appearance:   Alert, cooperative, no distress   HEENT:   Normocephalic, atraumatic, anicteric      Neck:  Supple, symmetrical, trachea midline   Lungs:   Clear to auscultation bilaterally; no rales, rhonchi or wheezing; respirations unlabored    Heart[de-identified]   Regular rate and rhythm; no murmur, rub, or gallop     Abdomen:   Soft, non-tender, non-distended; normal bowel sounds; no masses, no organomegaly    Genitalia:   Deferred    Rectal:   Deferred    Extremities:  No cyanosis, clubbing or edema    Pulses:  2+ and symmetric    Skin:  No jaundice, rashes, or lesions    Lymph nodes:  No palpable cervical lymphadenopathy        Lab Results:   No visits with results within 1 Day(s) from this visit  Latest known visit with results is:   Appointment on 01/06/2018   Component Date Value    WBC 01/06/2018 4 58     RBC 01/06/2018 4 51     Hemoglobin 01/06/2018 14 6     Hematocrit 01/06/2018 43 3     MCV 01/06/2018 96     MCH 01/06/2018 32 4     MCHC 01/06/2018 33 7     RDW 01/06/2018 12 3     Platelets 11/67/8490 249     MPV 01/06/2018 9 7     TSH 3RD GENERATON 01/06/2018 2 500     Glucose, Fasting 01/06/2018 87     Cholesterol 01/06/2018 183     Triglycerides 01/06/2018 55     HDL, Direct 01/06/2018 57     LDL Calculated 01/06/2018 115*    Hemoglobin A1C 01/06/2018 5 2     EAG 01/06/2018 103          Radiology Results:   No results found

## 2018-05-02 NOTE — PATIENT INSTRUCTIONS
Colonoscopy   AMBULATORY CARE:   What you need to know about a colonoscopy:  A colonoscopy is a procedure to examine the inside of your colon (intestine) with a scope  A scope is a flexible tube with a small light and camera on the end  Polyps or tissue growths may be removed during your colonoscopy  What you need to do the week before your colonoscopy: You will need to stop taking medicines that contain aspirin or iron for 7 days before your colonoscopy  If you take anticoagulants, such as warfarin, ask when you should stop taking it  Make plans for someone to drive you home after your procedure  How to prepare for your colonoscopy: Your healthcare provider will have you prepare your bowels before your procedure  Your bowels will need to be empty before your procedure to allow him to clearly see your colon  You will need to do the following the day before your procedure:  · Have only clear liquids  for the entire day before your colonoscopy  Clear liquid diet includes clear fruit juices and broths, clear flavored gelatin, and hard candy  It also includes coffee, tea, carbonated beverages, and clear sports drinks  · Follow your bowel prep as directed  There are many different preparations that can be given before a colonoscopy  Some are given over 2 hours and others over 6 hours  Some are given earlier in the afternoon the day before the colonoscopy  Others are given the day before and then the morning of the colonoscopy  With any bowel prep, stay close to the bathroom  This liquid will cause your bowels to move frequently  · An enema  may be needed  Your healthcare provider may tell you to use an enema to help clean out your bowels  · Do not eat or drink anything after midnight  This will help prevent problems that can happen if you vomit while under anesthesia  What will happen during your colonoscopy:   · You will be given medicine to help you relax   You will lie on your left side and raise one or both knees toward your chest  Your healthcare provider will examine your anus and use a finger to check your rectum  You may need another enema if your bowel is not empty  The scope will be lubricated and gently placed into your anus  It will then be passed through your rectum and into your colon  Water or air will be put into your colon to help clean or expand it  This is done so your healthcare provider can see your colon clearly  · Tissue samples may be taken from the walls of your bowel and sent to a lab for tests  If you have a polyp, your healthcare provider will pass a wire loop through the scope and use it to hold the polyp  The polyp is then burned or cut off the wall of your colon  Removed polyps are sent to a lab for tests  Pictures of your colon may be taken during the procedure  The scope will be removed when the procedure is done  What will happen after your colonoscopy:   · Rest after your procedure  You may feel bloated, have some gas and abdominal discomfort  You may need to lie on your right side with a heating pad on your abdomen  You may need to take short walks to help move the gas out  Eat small meals, if you feel bloated  Do not drive or make important decisions until the day after your procedure  · You may have polyps removed  Do not take aspirin or go on long car trips for 7 days after your procedure  Ask your healthcare provider about any other limits after your procedure  Risks of a colonoscopy: You may have pain or bleeding after the scope or polyps are removed  You may also have a slow heartbeat, decreased blood pressure, or increased sweating  Your colon may tear due to the increased pressure from the scope and other instruments  This may cause bowel contents to leak out of your colon and into your abdomen  If this happens, you will need to stay in the hospital and have surgery on your colon     Seek care immediately if:   · You have a large amount of bright red blood in your bowel movements  · Your abdomen is hard and firm and you have severe pain  · You have sudden trouble breathing  Contact your healthcare provider if:   · You develop a rash or hives  · You have a fever within 24 hours of your procedure  · You have not had a bowel movement for 3 days after your procedure  · You have questions or concerns about your condition or care  Activity:   · Do not lift, strain, or run  for 3 days after your procedure  · Rest after your procedure  You have been given medicine to relax you  Do not  drive or make important decisions until the day after your procedure  Return to your normal activity as directed  · Relieve gas and discomfort from bloating  by lying on your right side with a heating pad on your abdomen  You may need to take short walks to help the gas move out  Eat small meals until bloating is relieved  If you had polyps removed: For 7 days after your procedure:  · Do not  take aspirin  · Do not  go on long car rides  Help prevent constipation:   · Eat a variety of healthy foods  Healthy foods include fruit, vegetables, whole-grain breads, low-fat dairy products, beans, lean meat, and fish  Ask if you need to be on a special diet  Your healthcare provider may recommend that you eat high-fiber foods such as cooked beans  Fiber helps you have regular bowel movements  · Drink liquids as directed  Adults should drink between 9 and 13 eight-ounce cups of liquid every day  Ask what amount is best for you  For most people, good liquids to drink are water, juice, and milk  · Exercise as directed  Talk to your healthcare provider about the best exercise plan for you  Exercise can help prevent constipation, decrease your blood pressure and improve your health  Follow up with your healthcare provider as directed:  Write down your questions so you remember to ask them during your visits     © 2017 [employer] Information is for End User's use only and may not be sold, redistributed or otherwise used for commercial purposes  All illustrations and images included in CareNotes® are the copyrighted property of A D [employer] M [employer] Inc  or [employer]  The above information is an  only  It is not intended as medical advice for individual conditions or treatments  Talk to your doctor, nurse or pharmacist before following any medical regimen to see if it is safe and effective for you  Colonoscopy scheduled on 5/22/2018 with Dr Alexia Calderon  Kelly-prep sample / instructions giving to  Patient

## 2018-05-02 NOTE — PROGRESS NOTES
[facility] - Outpatient Consultation  Latasha Blackmon 50 y o  female MRN: [id number]  Encounter: [id number]          ASSESSMENT AND PLAN:      1  Colon cancer screening-will be scheduled for her index colonoscopy  She is average risk without any family history of colorectal cancer  Discussed risk and benefit of the procedure  - Case request operating room: COLONOSCOPY; Standing    2  Gastroesophageal reflux disease without esophagitis-stable with rare episodes  No further intervention required  Symptoms are triggered by food triggers  ______________________________________________________________________    HPI:      She is a very pleasant 27-year-old female (works as a ) presents here for her screening colonoscopy  Denies any family history of colorectal cancer  Otherwise overall doing well  She does have history of GERD but well controlled  REVIEW OF SYSTEMS:    CONSTITUTIONAL: Denies any fever, chills, rigors, and weight loss  HEENT: No earache or tinnitus  Denies hearing loss or visual disturbances  CARDIOVASCULAR: No chest pain or palpitations  RESPIRATORY: Denies any cough, hemoptysis, shortness of breath or dyspnea on exertion  GASTROINTESTINAL: As noted in the History of Present Illness  GENITOURINARY: No problems with urination  Denies any hematuria or dysuria  NEUROLOGIC: No dizziness or vertigo, denies headaches  MUSCULOSKELETAL: Denies any muscle or joint pain  SKIN: Denies skin rashes or itching  ENDOCRINE: Denies excessive thirst  Denies intolerance to heat or cold  PSYCHOSOCIAL: Denies depression or anxiety  Denies any recent memory loss         Historical Information   Past Medical History:   Diagnosis Date    Acute upper respiratory infection     Allergic rhinitis     Amenorrhea     Obesity     Perimenopausal symptoms      Past Surgical History:   Procedure Laterality Date    MOUTH SURGERY      TONSILLECTOMY Social History   History   Alcohol Use    Yes     History   Drug Use No     History   Smoking Status    Former Smoker   Smokeless Tobacco    Current User     Family History   Problem Relation Age of Onset    Heart disease Father     Heart attack Father     Arthritis Maternal Grandmother     Heart disease Maternal Grandmother     No Known Problems Mother      Well       Meds/Allergies       Current Outpatient Prescriptions:     estradiol (CLIMARA) 0 025 mg/24 hr    progesterone (PROMETRIUM) 100 MG capsule    No Known Allergies        Objective     Blood pressure 120/70, pulse 60, temperature 97 5 °F (36 4 °C), temperature source Tympanic, height 5' 11" (1 803 m), weight 93 9 kg (207 lb)  Body mass index is 28 87 kg/m²  PHYSICAL EXAM:      General Appearance:   Alert, cooperative, no distress   HEENT:   Normocephalic, atraumatic, anicteric      Neck:  Supple, symmetrical, trachea midline   Lungs:   Clear to auscultation bilaterally; no rales, rhonchi or wheezing; respirations unlabored    Heart[de-identified]   Regular rate and rhythm; no murmur, rub, or gallop  Abdomen:   Soft, non-tender, non-distended; normal bowel sounds; no masses, no organomegaly    Genitalia:   Deferred    Rectal:   Deferred    Extremities:  No cyanosis, clubbing or edema    Pulses:  2+ and symmetric    Skin:  No jaundice, rashes, or lesions    Lymph nodes:  No palpable cervical lymphadenopathy        Lab Results:   No visits with results within 1 Day(s) from this visit     Latest known visit with results is:   Appointment on 01/06/2018   Component Date Value    WBC 01/06/2018 4 58     RBC 01/06/2018 4 51     Hemoglobin 01/06/2018 14 6     Hematocrit 01/06/2018 43 3     MCV 01/06/2018 96     MCH 01/06/2018 32 4     MCHC 01/06/2018 33 7     RDW 01/06/2018 12 3     Platelets 52/51/3102 249     MPV 01/06/2018 9 7     TSH 3RD GENERATON 01/06/2018 2 500     Glucose, Fasting 01/06/2018 87     Cholesterol 01/06/2018 183     Triglycerides 01/06/2018 55     HDL, Direct 01/06/2018 57     LDL Calculated 01/06/2018 115*    Hemoglobin A1C 01/06/2018 5 2     EAG 01/06/2018 103          Radiology Results:   No results found

## 2018-05-09 ENCOUNTER — OFFICE VISIT (OUTPATIENT)
Dept: BARIATRICS | Facility: CLINIC | Age: 53
End: 2018-05-09

## 2018-05-09 VITALS — WEIGHT: 202.4 LBS | HEIGHT: 72 IN | BODY MASS INDEX: 27.41 KG/M2

## 2018-05-09 DIAGNOSIS — R63.5 ABNORMAL WEIGHT GAIN: ICD-10-CM

## 2018-05-09 PROCEDURE — RECHECK: Performed by: DIETITIAN, REGISTERED

## 2018-05-09 PROCEDURE — WMDI30: Performed by: DIETITIAN, REGISTERED

## 2018-05-09 NOTE — PROGRESS NOTES
Weight Management Medical Nutrition Assessment  Mey Kinney presented for a meal planning session  Today's weight is 202 4#   She has had a 0 7# weight gain in the past 2 weeks and overall she has lost 12 8# (6%TBW) in the past 2 months  She stated she struggled the past 2 weeks with snacking in particular on popcorn  She had difficulty with portion control of snacks  She stated she knows she went above her calorie goals some days  Trying for 1400 calories daily  Reviewed meal plan and discussed upcoming colonoscopy and travel plans  Anthropometric Measurements  Start Weight (lbs): 215 2#  Current Weight (lbs): 202 4#  TBW % Change from start weight:6%  Ideal Body Weight (lbs):160#  Goal Weight (lbs):under 200# then 185#    Weight Loss History  Previous weight loss attempts: Self Created Diets (Portion Control, Healthy Food Choices, etc )    Food and Nutrition Related History    Food Recall  6am Breakfast:Shake  930 Almonds  11:00 : Shake  1pm Snack:cheese/ tomato  4-5pm: protein bar   Dinner:Soup  Snack:      Beverages: water  Volume of beverage intake: 80oz    Weekends: Same  Cravings: salty snacks  Trouble area of day:afternoon    Frequency of Eating out: biweekly  Food restrictions:none  Cooking: self   Food Shopping: self    Physical Activity Intake  Activity:Walking- 30-60minutes  Frequency:frequently  Physical limitations/barriers to exercise: none    Estimated Needs  Energy    B[place] Energy Needs: BMR : 1650 calories   1-2# loss weekly sedentary:  980-1480 calories              1-2# loss  weekly Lightly active:   2600-6301 calories  Protein: 88-109gm      (1 2-1 5g/kg IBW)  Fluid: 85oz     (35mL/kg IBW)    Nutrition Diagnosis  Yes; Overweight/obesity  related to Excess energy intake as evidenced by  BMI more than normative standard for age and sex (overweight 25-29  9)       Nutrition Intervention    Nutrition Prescription  Calories:2043-2919 calories   Protein:90-110gm  Fluid:85oz        Nutrition Education:      Calorie controlled menu  Lean protein food choices  Healthy snack options  Food journaling tips      Nutrition Counseling:  Strategies: meal planning, portion sizes, healthy snack choices, hydration, fiber intake, protein intake, exercise, food journal      Monitoring and Evaluation:  Evaluation criteria:  Energy Intake: 1200 calories on sedentary days and flex to 1400 calories on walk days   Meet protein needs  Maintain adequate hydration  Monitor weekly weight  Meal planning/preparation  Food journal   Decreased portions at mealtimes and snacks  Physical activity     Barriers to learning:none  Readiness to change: Action  Comprehension: good  Expected Compliance: good

## 2018-05-21 ENCOUNTER — ANESTHESIA EVENT (OUTPATIENT)
Dept: GASTROENTEROLOGY | Facility: MEDICAL CENTER | Age: 53
End: 2018-05-21
Payer: COMMERCIAL

## 2018-05-22 ENCOUNTER — ANESTHESIA (OUTPATIENT)
Dept: GASTROENTEROLOGY | Facility: MEDICAL CENTER | Age: 53
End: 2018-05-22
Payer: COMMERCIAL

## 2018-05-22 ENCOUNTER — HOSPITAL ENCOUNTER (OUTPATIENT)
Facility: MEDICAL CENTER | Age: 53
Setting detail: OUTPATIENT SURGERY
Discharge: HOME | End: 2018-05-22
Attending: INTERNAL MEDICINE | Admitting: INTERNAL MEDICINE
Payer: COMMERCIAL

## 2018-05-22 VITALS
RESPIRATION RATE: 18 BRPM | TEMPERATURE: 97.1 F | HEART RATE: 47 BPM | OXYGEN SATURATION: 100 % | DIASTOLIC BLOOD PRESSURE: 53 MMHG | SYSTOLIC BLOOD PRESSURE: 93 MMHG

## 2018-05-22 DIAGNOSIS — Z12.11 COLON CANCER SCREENING: ICD-10-CM

## 2018-05-22 LAB — EXT PREGNANCY TEST URINE: NEGATIVE

## 2018-05-22 PROCEDURE — 88305 TISSUE EXAM BY PATHOLOGIST: CPT | Performed by: PATHOLOGY

## 2018-05-22 PROCEDURE — 81025 URINE PREGNANCY TEST: CPT | Performed by: ANESTHESIOLOGY

## 2018-05-22 PROCEDURE — 45385 COLONOSCOPY W/LESION REMOVAL: CPT | Performed by: INTERNAL MEDICINE

## 2018-05-22 RX ORDER — PROPOFOL 10 MG/ML
INJECTION, EMULSION INTRAVENOUS AS NEEDED
Status: DISCONTINUED | OUTPATIENT
Start: 2018-05-22 | End: 2018-05-22 | Stop reason: SURG

## 2018-05-22 RX ORDER — ONDANSETRON 2 MG/ML
INJECTION INTRAMUSCULAR; INTRAVENOUS AS NEEDED
Status: DISCONTINUED | OUTPATIENT
Start: 2018-05-22 | End: 2018-05-22 | Stop reason: SURG

## 2018-05-22 RX ORDER — LIDOCAINE HYDROCHLORIDE 20 MG/ML
INJECTION, SOLUTION EPIDURAL; INFILTRATION; INTRACAUDAL; PERINEURAL AS NEEDED
Status: DISCONTINUED | OUTPATIENT
Start: 2018-05-22 | End: 2018-05-22 | Stop reason: SURG

## 2018-05-22 RX ORDER — PROPOFOL 10 MG/ML
INJECTION, EMULSION INTRAVENOUS CONTINUOUS PRN
Status: DISCONTINUED | OUTPATIENT
Start: 2018-05-22 | End: 2018-05-22 | Stop reason: SURG

## 2018-05-22 RX ORDER — SODIUM CHLORIDE 9 MG/ML
125 INJECTION, SOLUTION INTRAVENOUS CONTINUOUS
Status: DISCONTINUED | OUTPATIENT
Start: 2018-05-22 | End: 2018-05-22 | Stop reason: HOSPADM

## 2018-05-22 RX ADMIN — ONDANSETRON 4 MG: 2 INJECTION INTRAMUSCULAR; INTRAVENOUS at 13:25

## 2018-05-22 RX ADMIN — PROPOFOL 180 MCG/KG/MIN: 10 INJECTION, EMULSION INTRAVENOUS at 13:09

## 2018-05-22 RX ADMIN — SODIUM CHLORIDE: 0.9 INJECTION, SOLUTION INTRAVENOUS at 13:23

## 2018-05-22 RX ADMIN — SODIUM CHLORIDE 125 ML/HR: 0.9 INJECTION, SOLUTION INTRAVENOUS at 12:21

## 2018-05-22 RX ADMIN — PROPOFOL 100 MG: 10 INJECTION, EMULSION INTRAVENOUS at 13:09

## 2018-05-22 RX ADMIN — LIDOCAINE HYDROCHLORIDE 60 MG: 20 INJECTION, SOLUTION EPIDURAL; INFILTRATION; INTRACAUDAL; PERINEURAL at 13:09

## 2018-05-22 NOTE — OP NOTE
**** GI/ENDOSCOPY REPORT ****     PATIENT NAME: Jadyn Mail ------ VISIT ID:  Patient ID:   [id number] YOB: 1965     INTRODUCTION: Colonoscopy - A 50 female patient presents for an outpatient   Colonoscopy at [address] [place] [address]  PREVIOUS COLONOSCOPY: No prior colonoscopy  INDICATIONS: Average-risk screening  CONSENT:  The benefits, risks, and alternatives to the procedure were   discussed and informed consent was obtained from the patient  PREPARATION: EKG, pulse, pulse oximetry and blood pressure were monitored   throughout the procedure  The patient was identified by myself both   verbally and by visual inspection of ID band  Airway Assessment   Classification: Airway class 2 - Visualization of the soft palate, fauces   and uvula  ASA Classification: See anesthesia record  MEDICATIONS: Anesthesia-check records     PROCEDURE:  The endoscope was passed without difficulty through the anus   under direct visualization and advanced to the cecum, confirmed by   appendiceal orifice and ileocecal valve  The scope was withdrawn and the   mucosa was carefully examined  The quality of the preparation was   excellent  Cecal Intubation Time: 5 minutes(s) Scope Withdrawal Time: 9   minutes(s)     RECTAL EXAM: Normal rectal exam      FINDINGS:  A single polyp, measuring 4 mm in size, was found in the   ascending colon  The polyp was completely removed by cold snare   polypectomy  The polyp was retrieved  A single polyp, measuring 3 mm in   size, was found in the colon  The polyp was completely removed by cold   biopsy polypectomy  The polyp was retrieved  There was evidence of mild   diverticulosis in the descending colon and sigmoid colon  COMPLICATIONS: There were no complications  IMPRESSIONS: A single polyp found in the ascending colon; removed by cold   snare polypectomy  A single polyp found in the colon; removed by cold   biopsy polypectomy  Mild diverticulosis found in the descending colon and   sigmoid colon  RECOMMENDATIONS: Colonoscopy recommended in 5 years  ESTIMATED BLOOD LOSS:     PATHOLOGY SPECIMENS: Completely removed by cold snare polypectomy  Completely removed by cold biopsy polypectomy  PROCEDURE CODES: Colonoscopy with biopsy : Colonoscopy with snare   polypectomy     ICD-9 Codes: 211 3 Benign neoplasm of colon 562 10 Diverticulosis of colon   (without mention of hemorrhage)     ICD-10 Codes: Z12 11 Encounter for screening for malignant neoplasm of   colon K63 5 Polyp of colon K63 5 Polyp of colon K57 Diverticular disease   of intestine     PERFORMED BY: CELSA Linares  on 05/22/2018  Version 1, electronically signed by CELSA Quick  on 05/22/2018 at   13:32

## 2018-05-22 NOTE — ANESTHESIA PREPROCEDURE EVALUATION
Review of Systems/Medical History  Patient summary reviewed  Chart reviewed  History of anesthetic complications PONV    Cardiovascular  Negative cardio ROS    Pulmonary  Smoker ex-smoker  ,        GI/Hepatic    GERD well controlled, Bowel prep       Negative  ROS        Endo/Other    Comment: Impaired fasting glucose Obesity    GYN       Hematology  Negative hematology ROS      Musculoskeletal  Negative musculoskeletal ROS        Neurology  Negative neurology ROS      Psychology   Negative psychology ROS              Physical Exam    Airway    Mallampati score: I  TM Distance: <3 FB  Neck ROM: full     Dental   No notable dental hx     Cardiovascular  Comment: Negative ROS, Rhythm: regular, Rate: normal, Cardiovascular exam normal    Pulmonary  Pulmonary exam normal     Other Findings        Anesthesia Plan  ASA Score- 2     Anesthesia Type- IV sedation with anesthesia with ASA Monitors  Additional Monitors:   Airway Plan:         Plan Factors- Patient instructed to abstain from smoking on day of procedure  Patient did not smoke on day of surgery  Induction- intravenous  Postoperative Plan-     Informed Consent- Anesthetic plan and risks discussed with patient

## 2018-05-22 NOTE — DISCHARGE INSTRUCTIONS
Colonoscopy   WHAT YOU NEED TO KNOW:   A colonoscopy is a procedure to examine the inside of your colon (intestine) with a scope  Polyps or tissue growths may have been removed during your colonoscopy  It is normal to feel bloated and to have some abdominal discomfort  You should be passing gas  If you have hemorrhoids or you had polyps removed, you may have a small amount of bleeding  DISCHARGE INSTRUCTIONS:   Seek care immediately if:   · You have a large amount of bright red blood in your bowel movements  · Your abdomen is hard and firm and you have severe pain  · You have sudden trouble breathing  Contact your healthcare provider if:   · You develop a rash or hives  · You have a fever within 24 hours of your procedure  · You have not had a bowel movement for 3 days after your procedure  · You have questions or concerns about your condition or care  Activity:   · Do not lift, strain, or run  for 3 days after your procedure  · Rest after your procedure  You have been given medicine to relax you  Do not  drive or make important decisions until the day after your procedure  Return to your normal activity as directed  · Relieve gas and discomfort from bloating  by lying on your right side with a heating pad on your abdomen  You may need to take short walks to help the gas move out  Eat small meals until bloating is relieved  If you had polyps removed: For 7 days after your procedure:  · Do not  take aspirin  · Do not  go on long car rides  Help prevent constipation:   · Eat a variety of healthy foods  Healthy foods include fruit, vegetables, whole-grain breads, low-fat dairy products, beans, lean meat, and fish  Ask if you need to be on a special diet  Your healthcare provider may recommend that you eat high-fiber foods such as cooked beans  Fiber helps you have regular bowel movements  · Drink liquids as directed    Adults should drink between 9 and 13 eight-ounce cups of liquid every day  Ask what amount is best for you  For most people, good liquids to drink are water, juice, and milk  · Exercise as directed  Talk to your healthcare provider about the best exercise plan for you  Exercise can help prevent constipation, decrease your blood pressure and improve your health  Follow up with your healthcare provider as directed:  Write down your questions so you remember to ask them during your visits  © 2017 [employer] Information is for End User's use only and may not be sold, redistributed or otherwise used for commercial purposes  All illustrations and images included in CareNotes® are the copyrighted property of A D [employer] M [employer] Inc  or [employer]  The above information is an  only  It is not intended as medical advice for individual conditions or treatments  Talk to your doctor, nurse or pharmacist before following any medical regimen to see if it is safe and effective for you  Colorectal Polyps   WHAT YOU NEED TO KNOW:   What are colorectal polyps? Colorectal polyps are small growths of tissue in the lining of the colon and rectum  Most polyps are hyperplastic polyps and are usually benign (noncancerous)  Certain types of polyps, called adenomatous polyps, may turn into cancer  What increases my risk of colorectal polyps? The exact cause of colorectal polyps is unknown  The following may increase your risk:  · Older age    · A diet of foods high in fat and low in fiber     · Family history of polyps    · Intestinal diseases, such as Crohn's disease or ulcerative colitis    · An unhealthy lifestyle, such as physical inactivity, smoking, or drinking alcohol    · Obesity  What are the signs and symptoms of colorectal polyps? · Blood in your bowel movement or bleeding from the rectum    · Change in bowel movement habits, such as diarrhea and constipation    · Abdominal pain  How are colorectal polyps diagnosed?   You should have fecal blood screening once a year for colorectal disease if you are over 48years old  You should be screened earlier if you have an intestinal disease or a family history of polyps or colorectal cancer  During this screening, a sample of your bowel movement is checked for blood, which may be an early sign of colorectal polyps or cancer  You may also need any of the following tests:  · Digital rectal exam:  Your healthcare provider will examine your anus and use a finger to check your rectum for polyps  · Barium enema: A barium enema is an x-ray of the colon  A tube is put into your anus, and a liquid called barium is put through the tube  Barium is used so that caregivers can see your colon better on the x-ray film  · Virtual colonoscopy: This is a CT scan that takes pictures of the inside of your colon and rectum  A small, flexible tube is put into your rectum and air or carbon dioxide (gas) is used to expand your colon  This lets healthcare providers clearly see your colon and any polyps on a monitor  · Colonoscopy or sigmoidoscopy: These procedures help your healthcare provider see the inside of your colon using a flexible tube with a small light and camera on the end  During a sigmoidoscopy, your healthcare provider will only look at rectum and lower colon  During a colonoscopy, healthcare providers will look at the full length of your colon  Healthcare providers may remove a small amount of tissue from the colon for a biopsy  How are colorectal polyps treated? · Polyp removal:  Polyps may be removed during your sigmoidoscopy or colonoscopy  · Polypectomy: This is surgery to remove your polyps  You may need laparoscopic or open surgery, depending on the type, size, and number of polyps that you have  Laparoscopy is done by inserting a small, flexible scope into incisions made on your abdomen  Open surgery is done by making a larger incision on your abdomen     What are the risks of colorectal polyps? You may bleed during a colonoscopy procedure  Your bowel may be perforated (torn) when polyps are removed  This may lead to an open abdominal surgery  During surgery, you may bleed too much or get an infection  Adenomatous polyps that are not removed may turn into cancer and become more difficult to treat  Where can I find support and more information? · [facility] ([facility])  [address] , [place] [address]  Phone: 2- 801 [phone]  Web Address: Clemente Son  [facility] gov  When should I contact my healthcare provider? · You have a fever  · You have chills, a cough, or feel weak and achy  · You have abdominal pain that does not go away or gets worse after you take medicine  · Your abdomen is swollen  · You are losing weight without trying  · You have questions or concerns about your condition or care  When should I seek immediate care or call 911? · You have sudden shortness of breath  · You have a fast heart rate, fast breathing, or are too dizzy to stand up  · You have severe abdominal pain  · You see blood in your bowel movement  CARE AGREEMENT:   You have the right to help plan your care  Learn about your health condition and how it may be treated  Discuss treatment options with your caregivers to decide what care you want to receive  You always have the right to refuse treatment  The above information is an  only  It is not intended as medical advice for individual conditions or treatments  Talk to your doctor, nurse or pharmacist before following any medical regimen to see if it is safe and effective for you  © 2017 [employer] Information is for End User's use only and may not be sold, redistributed or otherwise used for commercial purposes   All illustrations and images included in CareNotes® are the copyrighted property of [employer]  or [employer]

## 2018-05-22 NOTE — ANESTHESIA POSTPROCEDURE EVALUATION
Post-Op Assessment Note      CV Status:  Stable    Mental Status:  Alert and awake    Hydration Status:  Euvolemic    PONV Controlled:  Controlled    Airway Patency:  Patent    Post Op Vitals Reviewed:  Yes              BP (!) 85/47 (05/22/18 1330)    Temp     Pulse 55 (05/22/18 1330)   Resp 20 (05/22/18 1330)    SpO2 98 % (05/22/18 1330)

## 2018-05-24 ENCOUNTER — OFFICE VISIT (OUTPATIENT)
Dept: BARIATRICS | Facility: CLINIC | Age: 53
End: 2018-05-24

## 2018-05-24 VITALS — WEIGHT: 200.9 LBS | HEIGHT: 72 IN | BODY MASS INDEX: 27.21 KG/M2

## 2018-05-24 DIAGNOSIS — R63.5 ABNORMAL WEIGHT GAIN: ICD-10-CM

## 2018-05-24 PROCEDURE — WMDI30: Performed by: DIETITIAN, REGISTERED

## 2018-05-24 PROCEDURE — RECHECK: Performed by: DIETITIAN, REGISTERED

## 2018-05-24 NOTE — PROGRESS NOTES
Weight Management Medical Nutrition Assessment  Dariel Page presented for a meal planning session  Today's weight is 200 9#  She has lost 1 5# in the past 2 weeks and has had an overall weight loss of 14 3# (7 % TBW)   in the past 10 weeks  She stated her average calorie range is 3329-3503 calories  We discussed her upcoming travel plans  Anthropometric Measurements  Start Weight (lbs): 215 2#  Current Weight (lbs): 200 9#  TBW % Change from start weight:7%  Ideal Body Weight (lbs):160#  Goal Weight (lbs):under 200# then 185#    Weight Loss History  Previous weight loss attempts: Self Created Diets (Portion Control, Healthy Food Choices, etc )    Food and Nutrition Related History    Beverages: water  Volume of beverage intake: 60-80oz     Weekends: Same  Cravings: none  Trouble area of day:between meals - afternoon    Frequency of Eating out: biweekly  Food restrictions:none  Cooking: self   Food Shopping: self    Physical Activity Intake  Activity:Walking  Frequency:infrequently  Physical limitations/barriers to exercise: none     Estimated Needs  Energy  [place] Energy Needs: BMR : 1643 calories    1-2# loss weekly sedentary:  972-1472 calories              1-2# loss weekly lightly active:4083-3068 calories  Protein:88-109gm      (1 2-1 5g/kg IBW)  Fluid: 85oz     (35mL/kg IBW)    Nutrition Diagnosis  Yes; Overweight/obesity  related to Excess energy intake as evidenced by  BMI more than normative standard for age and sex (overweight 25-29  9)       Nutrition Intervention    Nutrition Prescription  Calories:1200 calories on sedentary days and 1400 calories on exercise days   Protein:90-110gm daily   Fluid:90oz daily    Meal Plan  Breakfast:  Snack:  Lunch:  Snack  Dinner:  Snack:    Nutrition Education:    Healthy Core Manual  Calorie controlled menu  Lean protein food choices  Healthy snack options  Food journaling tips      Nutrition Counseling:  Strategies: meal planning, portion sizes, healthy snack choices, hydration, fiber intake, protein intake, exercise, food journal      Monitoring and Evaluation:  Evaluation criteria:  Energy Intake  Meet protein needs  Maintain adequate hydration  Monitor weekly weight  Meal planning/preparation  Food journal   Decreased portions at mealtimes and snacks  Physical activity     Barriers to learning:none  Readiness to change: Action  Comprehension: good  Expected Compliance: good

## 2018-05-25 ENCOUNTER — TELEPHONE (OUTPATIENT)
Dept: GASTROENTEROLOGY | Facility: MEDICAL CENTER | Age: 53
End: 2018-05-25

## 2018-05-25 NOTE — TELEPHONE ENCOUNTER
----- Message from Shay Ann MD sent at 5/25/2018  3:40 PM EDT -----  Colonic polyps that were removed were adenomas repeat colonoscopy in 5 years

## 2019-01-15 DIAGNOSIS — Z78.0 ASYMPTOMATIC MENOPAUSE: Primary | ICD-10-CM

## 2019-02-21 DIAGNOSIS — N95.1 HOT FLASHES DUE TO MENOPAUSE: Primary | ICD-10-CM

## 2019-02-22 ENCOUNTER — TELEPHONE (OUTPATIENT)
Dept: OBGYN CLINIC | Facility: CLINIC | Age: 54
End: 2019-02-22

## 2019-02-22 DIAGNOSIS — R63.5 ABNORMAL WEIGHT GAIN: Primary | ICD-10-CM

## 2019-02-22 DIAGNOSIS — Z78.0 ASYMPTOMATIC MENOPAUSE: ICD-10-CM

## 2019-02-22 DIAGNOSIS — N95.1 HOT FLASHES DUE TO MENOPAUSE: ICD-10-CM

## 2019-02-22 NOTE — TELEPHONE ENCOUNTER
LM for patient that Dr Dennis Mo sent her medication to her pharmacy, however she does need to call us to schedule a yearly exam

## 2019-03-11 ENCOUNTER — APPOINTMENT (OUTPATIENT)
Dept: LAB | Facility: CLINIC | Age: 54
End: 2019-03-11
Payer: COMMERCIAL

## 2019-03-11 ENCOUNTER — TELEPHONE (OUTPATIENT)
Dept: OBGYN CLINIC | Facility: CLINIC | Age: 54
End: 2019-03-11

## 2019-03-11 DIAGNOSIS — N95.1 HOT FLASHES DUE TO MENOPAUSE: ICD-10-CM

## 2019-03-11 LAB
T4 FREE SERPL-MCNC: 0.94 NG/DL (ref 0.76–1.46)
TSH SERPL DL<=0.05 MIU/L-ACNC: 6.31 UIU/ML (ref 0.36–3.74)

## 2019-03-11 PROCEDURE — 84443 ASSAY THYROID STIM HORMONE: CPT

## 2019-03-11 PROCEDURE — 36415 COLL VENOUS BLD VENIPUNCTURE: CPT

## 2019-03-11 PROCEDURE — 84439 ASSAY OF FREE THYROXINE: CPT

## 2019-03-11 NOTE — TELEPHONE ENCOUNTER
----- Message from Michel Quezada, DO sent at 3/11/2019  2:21 PM EDT -----  Please let Mauro Keith know that her TSH was elevated although her T4 is normal   She should check with her family dr to see if she needs further follow up

## 2019-03-13 ENCOUNTER — TELEPHONE (OUTPATIENT)
Dept: OBGYN CLINIC | Facility: CLINIC | Age: 54
End: 2019-03-13

## 2019-03-13 ENCOUNTER — TELEPHONE (OUTPATIENT)
Dept: FAMILY MEDICINE CLINIC | Facility: CLINIC | Age: 54
End: 2019-03-13

## 2019-03-13 DIAGNOSIS — E03.9 HYPOTHYROIDISM, UNSPECIFIED TYPE: Primary | ICD-10-CM

## 2019-03-13 RX ORDER — LEVOTHYROXINE SODIUM 0.03 MG/1
25 TABLET ORAL
Qty: 30 TABLET | Refills: 5 | Status: SHIPPED | OUTPATIENT
Start: 2019-03-13 | End: 2019-08-23 | Stop reason: SDUPTHER

## 2019-03-13 NOTE — TELEPHONE ENCOUNTER
Regarding: Prescription Question  Contact: [phone]  ----- Message from [address] [place] [address] [address], Generic sent at 3/13/2019  3:00 PM EDT -----    My lady doctor    Dr Tilman Hamman told me my thyroid test from 3-11-19 was low and to contact you to talk about it  I was hoping to get a prescription to help balance me out and get my weight back on track  I have started running and eating better, but have been gaining weight just the same  I also put a call into your nurse about this   Let me know, Thanks :)

## 2019-03-13 NOTE — TELEPHONE ENCOUNTER
----- Message from Randy Carvalho DO sent at 3/11/2019  2:21 PM EDT -----  Please let Felicity Ghosh know that her TSH was elevated although her T4 is normal   She should check with her family dr to see if she needs further follow up

## 2019-03-15 ENCOUNTER — TELEPHONE (OUTPATIENT)
Dept: FAMILY MEDICINE CLINIC | Facility: CLINIC | Age: 54
End: 2019-03-15

## 2019-03-15 NOTE — TELEPHONE ENCOUNTER
Regarding: FW: Prescription Question  Contact: [phone]   Did u get my message ?  ----- Message -----  From: Josh Stevenrum: 3/13/2019   3:10 PM  To: Gemma Cartagena MD  Subject: Prescription Question                            Please advise    ----- Message -----  From: Florentin Low  Sent: 3/13/2019   3:00 PM  To:  [facility]  Subject: Prescription Question                            ----- Message from [place], Generic sent at 3/13/2019  3:00 PM EDT -----    My lady doctor    Dr Constantin Goddard told me my thyroid test from 3-11-19 was low and to contact you to talk about it  I was hoping to get a prescription to help balance me out and get my weight back on track  I have started running and eating better, but have been gaining weight just the same  I also put a call into your nurse about this   Let me know, Thanks :)

## 2019-04-18 ENCOUNTER — ANNUAL EXAM (OUTPATIENT)
Dept: OBGYN CLINIC | Facility: CLINIC | Age: 54
End: 2019-04-18
Payer: COMMERCIAL

## 2019-04-18 VITALS
DIASTOLIC BLOOD PRESSURE: 72 MMHG | HEIGHT: 72 IN | BODY MASS INDEX: 29.8 KG/M2 | WEIGHT: 220 LBS | SYSTOLIC BLOOD PRESSURE: 116 MMHG

## 2019-04-18 DIAGNOSIS — Z01.419 ENCOUNTER FOR ANNUAL ROUTINE GYNECOLOGICAL EXAMINATION: Primary | ICD-10-CM

## 2019-04-18 DIAGNOSIS — Z12.31 ENCOUNTER FOR SCREENING MAMMOGRAM FOR BREAST CANCER: ICD-10-CM

## 2019-04-18 DIAGNOSIS — N95.1 HOT FLASHES DUE TO MENOPAUSE: ICD-10-CM

## 2019-04-18 PROCEDURE — 99396 PREV VISIT EST AGE 40-64: CPT | Performed by: OBSTETRICS & GYNECOLOGY

## 2019-06-19 ENCOUNTER — HOSPITAL ENCOUNTER (OUTPATIENT)
Dept: MAMMOGRAPHY | Facility: MEDICAL CENTER | Age: 54
Discharge: HOME | End: 2019-06-19
Payer: COMMERCIAL

## 2019-06-19 ENCOUNTER — HOSPITAL ENCOUNTER (OUTPATIENT)
Dept: ULTRASOUND IMAGING | Facility: MEDICAL CENTER | Age: 54
Discharge: HOME | End: 2019-06-19
Payer: COMMERCIAL

## 2019-06-19 VITALS — HEIGHT: 72 IN | BODY MASS INDEX: 29.8 KG/M2 | WEIGHT: 220 LBS

## 2019-06-19 DIAGNOSIS — J06.9 ACUTE UPPER RESPIRATORY INFECTION: ICD-10-CM

## 2019-06-19 DIAGNOSIS — Z00.00 ENCOUNTER FOR GENERAL ADULT MEDICAL EXAMINATION WITHOUT ABNORMAL FINDINGS: ICD-10-CM

## 2019-06-19 DIAGNOSIS — Z12.31 ENCOUNTER FOR SCREENING MAMMOGRAM FOR MALIGNANT NEOPLASM OF BREAST: ICD-10-CM

## 2019-06-19 DIAGNOSIS — E03.9 HYPOTHYROIDISM, UNSPECIFIED TYPE: ICD-10-CM

## 2019-06-19 DIAGNOSIS — Z12.11 ENCOUNTER FOR SCREENING FOR MALIGNANT NEOPLASM OF COLON: ICD-10-CM

## 2019-06-19 DIAGNOSIS — J30.9 ALLERGIC RHINITIS: ICD-10-CM

## 2019-06-19 PROCEDURE — 76536 US EXAM OF HEAD AND NECK: CPT

## 2019-06-19 PROCEDURE — 77063 BREAST TOMOSYNTHESIS BI: CPT

## 2019-06-19 PROCEDURE — 77067 SCR MAMMO BI INCL CAD: CPT

## 2019-06-25 ENCOUNTER — TELEPHONE (OUTPATIENT)
Dept: FAMILY MEDICINE CLINIC | Facility: CLINIC | Age: 54
End: 2019-06-25

## 2019-07-03 ENCOUNTER — HOSPITAL ENCOUNTER (OUTPATIENT)
Dept: MAMMOGRAPHY | Facility: CLINIC | Age: 54
Discharge: HOME | End: 2019-07-03
Payer: COMMERCIAL

## 2019-07-03 ENCOUNTER — HOSPITAL ENCOUNTER (OUTPATIENT)
Dept: ULTRASOUND IMAGING | Facility: CLINIC | Age: 54
Discharge: HOME | End: 2019-07-03
Payer: COMMERCIAL

## 2019-07-03 VITALS — WEIGHT: 220 LBS | HEIGHT: 72 IN | BODY MASS INDEX: 29.8 KG/M2

## 2019-07-03 DIAGNOSIS — R92.8 ABNORMAL MAMMOGRAM: ICD-10-CM

## 2019-07-03 PROCEDURE — 77065 DX MAMMO INCL CAD UNI: CPT

## 2019-07-03 PROCEDURE — G0279 TOMOSYNTHESIS, MAMMO: HCPCS

## 2019-07-03 PROCEDURE — 76642 ULTRASOUND BREAST LIMITED: CPT

## 2019-08-20 ENCOUNTER — OFFICE VISIT (OUTPATIENT)
Dept: FAMILY MEDICINE CLINIC | Facility: CLINIC | Age: 54
End: 2019-08-20
Payer: COMMERCIAL

## 2019-08-20 VITALS
DIASTOLIC BLOOD PRESSURE: 72 MMHG | WEIGHT: 213 LBS | HEIGHT: 72 IN | SYSTOLIC BLOOD PRESSURE: 118 MMHG | BODY MASS INDEX: 28.85 KG/M2 | OXYGEN SATURATION: 98 % | HEART RATE: 62 BPM

## 2019-08-20 DIAGNOSIS — R10.13 EPIGASTRIC PAIN: ICD-10-CM

## 2019-08-20 DIAGNOSIS — Z13.89 SCREENING FOR GENITOURINARY CONDITION: ICD-10-CM

## 2019-08-20 DIAGNOSIS — Z13.220 NEED FOR LIPID SCREENING: ICD-10-CM

## 2019-08-20 DIAGNOSIS — Z13.21 ENCOUNTER FOR VITAMIN DEFICIENCY SCREENING: ICD-10-CM

## 2019-08-20 DIAGNOSIS — R73.01 IMPAIRED FASTING GLUCOSE: Primary | ICD-10-CM

## 2019-08-20 DIAGNOSIS — S39.011A STRAIN OF ABDOMINAL WALL, INITIAL ENCOUNTER: ICD-10-CM

## 2019-08-20 DIAGNOSIS — E03.9 HYPOTHYROIDISM, UNSPECIFIED TYPE: ICD-10-CM

## 2019-08-20 PROCEDURE — 99214 OFFICE O/P EST MOD 30 MIN: CPT | Performed by: INTERNAL MEDICINE

## 2019-08-20 PROCEDURE — 3008F BODY MASS INDEX DOCD: CPT | Performed by: INTERNAL MEDICINE

## 2019-08-20 NOTE — PROGRESS NOTES
Assessment/Plan:         Diagnoses and all orders for this visit:    Strain of abdominal wall, initial encounter  On clinical exam it looks like abdominal discomfort that she experiencing is 2nd to abdominal wall hernia and irritation of epigastric nerves  I would like to get a copy of the CT scan that was done several years ago  Consultation could be considered  I encouraged the patient to seek immediate medical assistance if she begins to have such can't for discomfort again  Epigastric pain    Hypothyroidism, unspecified type  -     TSH, 3rd generation with Free T4 reflex    Need for lipid screening  -     Lipid panel    Screening for genitourinary condition  -     Urinalysis with reflex to microscopic    Encounter for vitamin deficiency screening  -     Vitamin D Panel  Impaired fasting glucose  -     CBC and differential  -     Comprehensive metabolic panel          Subjective:      Patient ID: Duarte Vale is a 48 y o  female  HPI  Patient is here to discuss abdominal discomfort that she has been intermittently have been having for some months  Usually happens when she is bending over it is localized to right upper quadrant and midepigastrium and symptoms will go down to her belly button  She denies any dyspepsia dysphagia fever chills nausea vomiting  Her bowels have been regular  She had an EGD 2018 and is asked to come back in 5 years  Patient has been exercising and watching her diet  When the pain started a few months ago it reminded her of somewhat similar pain several years ago that prompted her to go to urgent care and had a CT scan of the abdomen done that showed she had internal hernia  She has not had any trouble since then for all these years  The copy of this report is not available to me at this time  Patient reports no dyspepsia dysphagia odynophagia early satiety bowel changes  Patient is due for repeat thyroid testing    The following portions of the patient's history were reviewed and updated as appropriate: allergies, current medications, past family history, past medical history, past social history, past surgical history and problem list     Review of Systems   Constitutional: Negative for appetite change, chills and fever  Gastrointestinal: Positive for abdominal pain  Negative for diarrhea and nausea  Endocrine:        As above   Neurological: Negative for dizziness  Objective:      /72 (BP Location: Left arm, Patient Position: Sitting, Cuff Size: Standard)   Pulse 62   Ht 6' (1 829 m)   Wt 96 6 kg (213 lb)   SpO2 98%   BMI 28 89 kg/m²          Physical Exam   Eyes: Conjunctivae are normal    Cardiovascular: Normal rate and regular rhythm  Pulmonary/Chest: Effort normal and breath sounds normal  No stridor  No respiratory distress  She has no wheezes  Abdominal: Soft  Bowel sounds are normal  She exhibits no distension  There is tenderness (Mild tenderness alba I could not appreciate any abdominal wall hernia)  There is no guarding  Negative Carlos sign   Neurological: She is alert

## 2019-08-21 ENCOUNTER — APPOINTMENT (OUTPATIENT)
Dept: LAB | Facility: CLINIC | Age: 54
End: 2019-08-21
Payer: COMMERCIAL

## 2019-08-21 LAB
ALBUMIN SERPL BCP-MCNC: 3.9 G/DL (ref 3.5–5)
ALP SERPL-CCNC: 59 U/L (ref 46–116)
ALT SERPL W P-5'-P-CCNC: 20 U/L (ref 12–78)
ANION GAP SERPL CALCULATED.3IONS-SCNC: 4 MMOL/L (ref 4–13)
AST SERPL W P-5'-P-CCNC: 15 U/L (ref 5–45)
BACTERIA UR QL AUTO: ABNORMAL /HPF
BASOPHILS # BLD AUTO: 0.02 THOUSANDS/ΜL (ref 0–0.1)
BASOPHILS NFR BLD AUTO: 0 % (ref 0–1)
BILIRUB SERPL-MCNC: 0.45 MG/DL (ref 0.2–1)
BILIRUB UR QL STRIP: ABNORMAL
BUN SERPL-MCNC: 12 MG/DL (ref 5–25)
CALCIUM SERPL-MCNC: 9.2 MG/DL (ref 8.3–10.1)
CHLORIDE SERPL-SCNC: 107 MMOL/L (ref 100–108)
CHOLEST SERPL-MCNC: 174 MG/DL (ref 50–200)
CLARITY UR: ABNORMAL
CO2 SERPL-SCNC: 29 MMOL/L (ref 21–32)
COLOR UR: ABNORMAL
CREAT SERPL-MCNC: 0.86 MG/DL (ref 0.6–1.3)
EOSINOPHIL # BLD AUTO: 0.04 THOUSAND/ΜL (ref 0–0.61)
EOSINOPHIL NFR BLD AUTO: 1 % (ref 0–6)
ERYTHROCYTE [DISTWIDTH] IN BLOOD BY AUTOMATED COUNT: 12.4 % (ref 11.6–15.1)
GFR SERPL CREATININE-BSD FRML MDRD: 78 ML/MIN/1.73SQ M
GLUCOSE P FAST SERPL-MCNC: 80 MG/DL (ref 65–99)
GLUCOSE UR STRIP-MCNC: NEGATIVE MG/DL
HCT VFR BLD AUTO: 43.6 % (ref 34.8–46.1)
HDLC SERPL-MCNC: 50 MG/DL (ref 40–60)
HGB BLD-MCNC: 14 G/DL (ref 11.5–15.4)
HGB UR QL STRIP.AUTO: NEGATIVE
IMM GRANULOCYTES # BLD AUTO: 0.01 THOUSAND/UL (ref 0–0.2)
IMM GRANULOCYTES NFR BLD AUTO: 0 % (ref 0–2)
KETONES UR STRIP-MCNC: ABNORMAL MG/DL
LDLC SERPL CALC-MCNC: 110 MG/DL (ref 0–100)
LEUKOCYTE ESTERASE UR QL STRIP: NEGATIVE
LYMPHOCYTES # BLD AUTO: 0.93 THOUSANDS/ΜL (ref 0.6–4.47)
LYMPHOCYTES NFR BLD AUTO: 15 % (ref 14–44)
MCH RBC QN AUTO: 31.4 PG (ref 26.8–34.3)
MCHC RBC AUTO-ENTMCNC: 32.1 G/DL (ref 31.4–37.4)
MCV RBC AUTO: 98 FL (ref 82–98)
MONOCYTES # BLD AUTO: 0.34 THOUSAND/ΜL (ref 0.17–1.22)
MONOCYTES NFR BLD AUTO: 5 % (ref 4–12)
NEUTROPHILS # BLD AUTO: 4.98 THOUSANDS/ΜL (ref 1.85–7.62)
NEUTS SEG NFR BLD AUTO: 79 % (ref 43–75)
NITRITE UR QL STRIP: NEGATIVE
NON-SQ EPI CELLS URNS QL MICRO: ABNORMAL /HPF
NONHDLC SERPL-MCNC: 124 MG/DL
NRBC BLD AUTO-RTO: 0 /100 WBCS
PH UR STRIP.AUTO: 6 [PH]
PLATELET # BLD AUTO: 251 THOUSANDS/UL (ref 149–390)
PMV BLD AUTO: 9.5 FL (ref 8.9–12.7)
POTASSIUM SERPL-SCNC: 4.4 MMOL/L (ref 3.5–5.3)
PROT SERPL-MCNC: 7.4 G/DL (ref 6.4–8.2)
PROT UR STRIP-MCNC: ABNORMAL MG/DL
RBC # BLD AUTO: 4.46 MILLION/UL (ref 3.81–5.12)
RBC #/AREA URNS AUTO: ABNORMAL /HPF
SODIUM SERPL-SCNC: 140 MMOL/L (ref 136–145)
SP GR UR STRIP.AUTO: 1.03 (ref 1–1.03)
TRIGL SERPL-MCNC: 69 MG/DL
TSH SERPL DL<=0.05 MIU/L-ACNC: 2.83 UIU/ML (ref 0.36–3.74)
UROBILINOGEN UR QL STRIP.AUTO: 1 E.U./DL
WBC # BLD AUTO: 6.32 THOUSAND/UL (ref 4.31–10.16)
WBC #/AREA URNS AUTO: ABNORMAL /HPF

## 2019-08-21 PROCEDURE — 80061 LIPID PANEL: CPT | Performed by: INTERNAL MEDICINE

## 2019-08-21 PROCEDURE — 85025 COMPLETE CBC W/AUTO DIFF WBC: CPT | Performed by: INTERNAL MEDICINE

## 2019-08-21 PROCEDURE — 82306 VITAMIN D 25 HYDROXY: CPT | Performed by: INTERNAL MEDICINE

## 2019-08-21 PROCEDURE — 36415 COLL VENOUS BLD VENIPUNCTURE: CPT | Performed by: INTERNAL MEDICINE

## 2019-08-21 PROCEDURE — 84443 ASSAY THYROID STIM HORMONE: CPT | Performed by: INTERNAL MEDICINE

## 2019-08-21 PROCEDURE — 81001 URINALYSIS AUTO W/SCOPE: CPT | Performed by: INTERNAL MEDICINE

## 2019-08-21 PROCEDURE — 80053 COMPREHEN METABOLIC PANEL: CPT | Performed by: INTERNAL MEDICINE

## 2019-08-23 ENCOUNTER — TELEPHONE (OUTPATIENT)
Dept: FAMILY MEDICINE CLINIC | Facility: CLINIC | Age: 54
End: 2019-08-23

## 2019-08-23 DIAGNOSIS — E03.9 HYPOTHYROIDISM, UNSPECIFIED TYPE: ICD-10-CM

## 2019-08-23 RX ORDER — LEVOTHYROXINE SODIUM 0.05 MG/1
TABLET ORAL
Qty: 60 TABLET | Refills: 1 | Status: SHIPPED | OUTPATIENT
Start: 2019-08-23 | End: 2019-11-27 | Stop reason: SDUPTHER

## 2019-08-23 NOTE — TELEPHONE ENCOUNTER
----- Message from Ann Marie Allison MD sent at 8/23/2019 12:23 PM EDT -----  Blood sugar is very good  TSH has improved but not at target  I am calling new prescription of Synthroid and repeat TSH in 6 weeks

## 2019-08-30 LAB
25(OH)D2 SERPL-MCNC: <1 NG/ML
25(OH)D3 SERPL-MCNC: 31 NG/ML
25(OH)D3+25(OH)D2 SERPL-MCNC: 31 NG/ML

## 2019-09-07 DIAGNOSIS — E03.9 HYPOTHYROIDISM, UNSPECIFIED TYPE: ICD-10-CM

## 2019-09-09 RX ORDER — LEVOTHYROXINE SODIUM 0.05 MG/1
TABLET ORAL
Qty: 90 TABLET | Refills: 0 | Status: SHIPPED | OUTPATIENT
Start: 2019-09-09 | End: 2019-11-21 | Stop reason: ALTCHOICE

## 2019-11-21 ENCOUNTER — OFFICE VISIT (OUTPATIENT)
Dept: FAMILY MEDICINE CLINIC | Facility: CLINIC | Age: 54
End: 2019-11-21
Payer: COMMERCIAL

## 2019-11-21 VITALS
DIASTOLIC BLOOD PRESSURE: 80 MMHG | TEMPERATURE: 97.2 F | HEIGHT: 72 IN | OXYGEN SATURATION: 98 % | SYSTOLIC BLOOD PRESSURE: 114 MMHG | HEART RATE: 70 BPM | WEIGHT: 201 LBS | BODY MASS INDEX: 27.22 KG/M2

## 2019-11-21 DIAGNOSIS — L70.9 ACNE, UNSPECIFIED ACNE TYPE: ICD-10-CM

## 2019-11-21 DIAGNOSIS — H02.403 PTOSIS OF EYELID, BILATERAL: ICD-10-CM

## 2019-11-21 DIAGNOSIS — Z23 NEED FOR VACCINATION: ICD-10-CM

## 2019-11-21 DIAGNOSIS — L65.9 HAIR LOSS: ICD-10-CM

## 2019-11-21 DIAGNOSIS — E03.9 HYPOTHYROIDISM, UNSPECIFIED TYPE: ICD-10-CM

## 2019-11-21 DIAGNOSIS — H69.82 EUSTACHIAN TUBE DYSFUNCTION, LEFT: Primary | ICD-10-CM

## 2019-11-21 DIAGNOSIS — Z01.818 PREOPERATIVE CLEARANCE: ICD-10-CM

## 2019-11-21 PROCEDURE — 99214 OFFICE O/P EST MOD 30 MIN: CPT | Performed by: INTERNAL MEDICINE

## 2019-11-21 PROCEDURE — 1036F TOBACCO NON-USER: CPT | Performed by: INTERNAL MEDICINE

## 2019-11-21 RX ORDER — FLUTICASONE PROPIONATE 50 MCG
1 SPRAY, SUSPENSION (ML) NASAL DAILY
Qty: 1 BOTTLE | Refills: 1 | Status: SHIPPED | OUTPATIENT
Start: 2019-11-21 | End: 2019-12-19 | Stop reason: SDUPTHER

## 2019-11-21 NOTE — PROGRESS NOTES
Assessment/Plan:         Diagnoses and all orders for this visit:  Preoperative clearance  -     Comprehensive metabolic panel  -     CBC and differential  Patient is at lowrisk from cardiovascular standpoint at this time without any additional cardiac testing  Reevaluation would be needed if he develops any symptoms prior to surgery  Ptosis of eyelid, bilateral    Eustachian tube dysfunction, left  -     fluticasone (FLONASE) 50 mcg/act nasal spray; 1 spray into each nostril daily  Patient will continue with antihistamine    Hair loss  -     Insulin, random  -     Testosterone, Free and Weakly Bound  -     DHEA-sulfate  -     Comprehensive metabolic panel  -     Androsterone level  -     17-Hydroxyprogesterone  -     CBC and differential    Hypothyroidism, unspecified type  -     TSH, 3rd generation with Free T4 reflex    Acne, unspecified acne type  -     Insulin, random  -     Testosterone, Free and Weakly Bound  -     DHEA-sulfate  -     Androsterone level  -     17-Hydroxyprogesterone        Subjective:      Patient ID: Mayte Edwards is a 48 y o  female  HPI  Patient with history of hypothyroidism a is here for preop clearance for upcoming bilateral blepharoplasty for ptosis  Patient is healthy blood pressure is fine last set of lab studies were unremarkable  Under CBC CMP is requested by the surgeon  EKG is unremarkable  Patient also has ongoing sensation left ear fullness since she returned from [place] where she did scuba diving  She had a lot of congestion that time  She did take Claritin which did help her symptoms when she has persistent above-mentioned symptoms  Minimal soreness in the throat, facial pressure, clear nasal discharge  Reports no fevers and chills  Minimal cough which is nonproductive  Patient complains of fine facial acne and thickening of facial hair  She has noticed hair loss as well    She had been to having heat flashes and is on Climara and progesterone  Patient's last TSH was somewhat on the higher side and her Synthroid was increased to 50  Advise repeat TSH and we would adjust the dose if needed  The following portions of the patient's history were reviewed and updated as appropriate: allergies, current medications, past family history, past medical history, past social history, past surgical history and problem list       Review of Systems   Constitutional: Negative for chills and fever  HENT:        As above   Respiratory: Positive for cough  Negative for shortness of breath  Cardiovascular: Negative for chest pain, palpitations and leg swelling  Gastrointestinal: Negative for abdominal pain and diarrhea  Objective:      /80 (BP Location: Left arm, Patient Position: Sitting, Cuff Size: Standard)   Pulse 70   Temp (!) 97 2 °F (36 2 °C)   Ht 6' (1 829 m)   Wt 91 2 kg (201 lb)   SpO2 98%   BMI 27 26 kg/m²          Physical Exam   Constitutional: She is oriented to person, place, and time  No distress  HENT:   Clear fluid behind left ear  Minimal postnasal drainage  Negative facial pressure   Eyes: Conjunctivae are normal    Cardiovascular: Normal rate, regular rhythm and normal heart sounds  No murmur heard  Pulmonary/Chest: Effort normal and breath sounds normal  No stridor  No respiratory distress  She has no wheezes  She has no rales  Musculoskeletal: She exhibits no edema  Neurological: She is alert and oriented to person, place, and time  Psychiatric: She has a normal mood and affect   Her behavior is normal

## 2019-11-22 PROCEDURE — 90471 IMMUNIZATION ADMIN: CPT

## 2019-11-22 PROCEDURE — 90714 TD VACC NO PRESV 7 YRS+ IM: CPT

## 2019-11-25 ENCOUNTER — DOCUMENTATION (OUTPATIENT)
Dept: FAMILY MEDICINE CLINIC | Facility: CLINIC | Age: 54
End: 2019-11-25

## 2019-11-26 ENCOUNTER — TRANSCRIBE ORDERS (OUTPATIENT)
Dept: LAB | Facility: CLINIC | Age: 54
End: 2019-11-26

## 2019-11-26 ENCOUNTER — APPOINTMENT (OUTPATIENT)
Dept: LAB | Facility: CLINIC | Age: 54
End: 2019-11-26
Payer: COMMERCIAL

## 2019-11-26 LAB
ALBUMIN SERPL BCP-MCNC: 3.8 G/DL (ref 3.5–5)
ALP SERPL-CCNC: 58 U/L (ref 46–116)
ALT SERPL W P-5'-P-CCNC: 24 U/L (ref 12–78)
ANION GAP SERPL CALCULATED.3IONS-SCNC: 5 MMOL/L (ref 4–13)
AST SERPL W P-5'-P-CCNC: 14 U/L (ref 5–45)
BASOPHILS # BLD AUTO: 0.05 THOUSANDS/ΜL (ref 0–0.1)
BASOPHILS NFR BLD AUTO: 1 % (ref 0–1)
BILIRUB SERPL-MCNC: 0.37 MG/DL (ref 0.2–1)
BUN SERPL-MCNC: 11 MG/DL (ref 5–25)
CALCIUM SERPL-MCNC: 9.4 MG/DL (ref 8.3–10.1)
CHLORIDE SERPL-SCNC: 109 MMOL/L (ref 100–108)
CO2 SERPL-SCNC: 26 MMOL/L (ref 21–32)
CREAT SERPL-MCNC: 0.84 MG/DL (ref 0.6–1.3)
EOSINOPHIL # BLD AUTO: 0.11 THOUSAND/ΜL (ref 0–0.61)
EOSINOPHIL NFR BLD AUTO: 2 % (ref 0–6)
ERYTHROCYTE [DISTWIDTH] IN BLOOD BY AUTOMATED COUNT: 12 % (ref 11.6–15.1)
GFR SERPL CREATININE-BSD FRML MDRD: 80 ML/MIN/1.73SQ M
GLUCOSE P FAST SERPL-MCNC: 83 MG/DL (ref 65–99)
HCT VFR BLD AUTO: 43.2 % (ref 34.8–46.1)
HGB BLD-MCNC: 14 G/DL (ref 11.5–15.4)
IMM GRANULOCYTES # BLD AUTO: 0.01 THOUSAND/UL (ref 0–0.2)
IMM GRANULOCYTES NFR BLD AUTO: 0 % (ref 0–2)
INSULIN SERPL-ACNC: 5.5 MU/L (ref 3–25)
LYMPHOCYTES # BLD AUTO: 1.28 THOUSANDS/ΜL (ref 0.6–4.47)
LYMPHOCYTES NFR BLD AUTO: 23 % (ref 14–44)
MCH RBC QN AUTO: 31.7 PG (ref 26.8–34.3)
MCHC RBC AUTO-ENTMCNC: 32.4 G/DL (ref 31.4–37.4)
MCV RBC AUTO: 98 FL (ref 82–98)
MONOCYTES # BLD AUTO: 0.41 THOUSAND/ΜL (ref 0.17–1.22)
MONOCYTES NFR BLD AUTO: 7 % (ref 4–12)
NEUTROPHILS # BLD AUTO: 3.7 THOUSANDS/ΜL (ref 1.85–7.62)
NEUTS SEG NFR BLD AUTO: 67 % (ref 43–75)
NRBC BLD AUTO-RTO: 0 /100 WBCS
PLATELET # BLD AUTO: 259 THOUSANDS/UL (ref 149–390)
PMV BLD AUTO: 9.5 FL (ref 8.9–12.7)
POTASSIUM SERPL-SCNC: 3.9 MMOL/L (ref 3.5–5.3)
PROT SERPL-MCNC: 7.5 G/DL (ref 6.4–8.2)
RBC # BLD AUTO: 4.42 MILLION/UL (ref 3.81–5.12)
SODIUM SERPL-SCNC: 140 MMOL/L (ref 136–145)
TSH SERPL DL<=0.05 MIU/L-ACNC: 2.87 UIU/ML (ref 0.36–3.74)
WBC # BLD AUTO: 5.56 THOUSAND/UL (ref 4.31–10.16)

## 2019-11-26 PROCEDURE — 84402 ASSAY OF FREE TESTOSTERONE: CPT | Performed by: INTERNAL MEDICINE

## 2019-11-26 PROCEDURE — 83498 ASY HYDROXYPROGESTERONE 17-D: CPT | Performed by: INTERNAL MEDICINE

## 2019-11-26 PROCEDURE — 83525 ASSAY OF INSULIN: CPT | Performed by: INTERNAL MEDICINE

## 2019-11-26 PROCEDURE — 84403 ASSAY OF TOTAL TESTOSTERONE: CPT | Performed by: INTERNAL MEDICINE

## 2019-11-26 PROCEDURE — 80053 COMPREHEN METABOLIC PANEL: CPT | Performed by: INTERNAL MEDICINE

## 2019-11-26 PROCEDURE — 82160 ASSAY OF ANDROSTERONE: CPT | Performed by: INTERNAL MEDICINE

## 2019-11-26 PROCEDURE — 85025 COMPLETE CBC W/AUTO DIFF WBC: CPT | Performed by: INTERNAL MEDICINE

## 2019-11-26 PROCEDURE — 36415 COLL VENOUS BLD VENIPUNCTURE: CPT | Performed by: INTERNAL MEDICINE

## 2019-11-26 PROCEDURE — 82627 DEHYDROEPIANDROSTERONE: CPT | Performed by: INTERNAL MEDICINE

## 2019-11-26 PROCEDURE — 84443 ASSAY THYROID STIM HORMONE: CPT | Performed by: INTERNAL MEDICINE

## 2019-11-27 ENCOUNTER — TELEPHONE (OUTPATIENT)
Dept: FAMILY MEDICINE CLINIC | Facility: CLINIC | Age: 54
End: 2019-11-27

## 2019-11-27 DIAGNOSIS — E03.9 HYPOTHYROIDISM, UNSPECIFIED TYPE: ICD-10-CM

## 2019-11-27 LAB — DHEA-S SERPL-MCNC: 85.2 UG/DL (ref 41.2–243.7)

## 2019-11-27 RX ORDER — LEVOTHYROXINE SODIUM 0.07 MG/1
TABLET ORAL
Qty: 60 TABLET | Refills: 1 | Status: SHIPPED | OUTPATIENT
Start: 2019-11-27 | End: 2020-03-23

## 2019-11-27 NOTE — TELEPHONE ENCOUNTER
----- Message from Francoise Moyer MD sent at 11/27/2019  9:51 AM EST -----  TSH is on the higher side    Increasing Synthroid repeat TSH in 2 months

## 2019-11-30 LAB — 17OHP SERPL-MCNC: 14 NG/DL

## 2019-12-03 LAB
DEPRECATED TESTOST FREE FR SERPL: 0.5 NG/DL (ref 0–9.5)
TESTOST SERPL-MCNC: 8 NG/DL (ref 3–41)
TESTOSTERONE.FREE+WB MFR SERPL: 6.1 % (ref 3–18)

## 2019-12-04 LAB — ANDROSTERONE SERPL-MCNC: 8.2 NG/DL

## 2019-12-09 ENCOUNTER — TELEPHONE (OUTPATIENT)
Dept: FAMILY MEDICINE CLINIC | Facility: CLINIC | Age: 54
End: 2019-12-09

## 2019-12-19 DIAGNOSIS — H69.82 EUSTACHIAN TUBE DYSFUNCTION, LEFT: ICD-10-CM

## 2019-12-19 RX ORDER — FLUTICASONE PROPIONATE 50 MCG
SPRAY, SUSPENSION (ML) NASAL
Qty: 16 ML | Refills: 1 | Status: SHIPPED | OUTPATIENT
Start: 2019-12-19 | End: 2020-01-21

## 2020-01-21 DIAGNOSIS — H69.82 EUSTACHIAN TUBE DYSFUNCTION, LEFT: ICD-10-CM

## 2020-01-21 RX ORDER — FLUTICASONE PROPIONATE 50 MCG
SPRAY, SUSPENSION (ML) NASAL
Qty: 16 ML | Refills: 0 | Status: SHIPPED | OUTPATIENT
Start: 2020-01-21 | End: 2020-02-17

## 2020-02-17 ENCOUNTER — OFFICE VISIT (OUTPATIENT)
Dept: FAMILY MEDICINE CLINIC | Facility: CLINIC | Age: 55
End: 2020-02-17
Payer: COMMERCIAL

## 2020-02-17 VITALS
DIASTOLIC BLOOD PRESSURE: 80 MMHG | HEIGHT: 72 IN | BODY MASS INDEX: 27.77 KG/M2 | WEIGHT: 205 LBS | HEART RATE: 66 BPM | SYSTOLIC BLOOD PRESSURE: 110 MMHG | OXYGEN SATURATION: 96 %

## 2020-02-17 DIAGNOSIS — Z00.00 ANNUAL PHYSICAL EXAM: Primary | ICD-10-CM

## 2020-02-17 PROCEDURE — 99396 PREV VISIT EST AGE 40-64: CPT | Performed by: INTERNAL MEDICINE

## 2020-02-17 RX ORDER — DIPHENOXYLATE HYDROCHLORIDE AND ATROPINE SULFATE 2.5; .025 MG/1; MG/1
1 TABLET ORAL DAILY
COMMUNITY

## 2020-02-17 RX ORDER — MELATONIN
1000 DAILY
COMMUNITY

## 2020-02-17 NOTE — PROGRESS NOTES
Assessment/Plan:         Diagnoses and all orders for this visit:    Annual physical exam   patient is up-to-date with flu vaccine and annual gyn  Other orders  -     multivitamin (THERAGRAN) TABS; Take 1 tablet by mouth daily  -     Probiotic Product (PROBIOTIC-10 PO); Take by mouth  -     cholecalciferol (VITAMIN D3) 1,000 units tablet; Take 1,000 Units by mouth daily  -     Misc Natural Products (FIBER 7 PO); Take by mouth        Subjective:      Patient ID: Viviana Carballo is a 48 y o  female  HPI    patient is here for annual physical   She has history of hypothyroidism and tolerating 75 mg of levothyroxine quite well  Patient has generalized anxiety disorder which gets heightened with height, tight spaces and flight  Patient has a pet which has been emotional support for her and   I agree will be helpful if she carries it with her on the upcoming flight  The following portions of the patient's history were reviewed and updated as appropriate: allergies, current medications, past family history, past medical history, past social history, past surgical history and problem list     Review of Systems   Constitutional: Positive for unexpected weight change (Has lost significant amount of weight  by exercises)  Negative for appetite change, chills, fatigue and fever  HENT: Negative for congestion, hearing loss, postnasal drip, trouble swallowing and voice change  Eyes: Negative for pain and visual disturbance  Respiratory: Negative for cough, chest tightness and shortness of breath  Cardiovascular: Negative for chest pain, palpitations and leg swelling  Gastrointestinal: Negative for abdominal pain, blood in stool, constipation, diarrhea, nausea and vomiting  Endocrine: Negative for cold intolerance, heat intolerance, polydipsia and polyphagia  Genitourinary: Negative for difficulty urinating, flank pain, frequency and hematuria     Musculoskeletal: Negative for arthralgias, back pain, gait problem, joint swelling and myalgias  Skin: Negative for rash  Neurological: Negative for dizziness, weakness, light-headedness, numbness and headaches  Hematological: Negative for adenopathy  Does not bruise/bleed easily  Psychiatric/Behavioral: Negative for confusion, dysphoric mood and sleep disturbance  The patient is nervous/anxious (As above)  Objective:      /80 (BP Location: Left arm, Patient Position: Sitting, Cuff Size: Standard)   Pulse 66   Ht 6' (1 829 m)   Wt 93 kg (205 lb)   SpO2 96%   BMI 27 80 kg/m²          Physical Exam   Constitutional: She is oriented to person, place, and time  She appears well-developed and well-nourished  No distress  HENT:   Mouth/Throat: Oropharynx is clear and moist  No oropharyngeal exudate  Eyes: Conjunctivae are normal  No scleral icterus  Neck: No thyromegaly present  Cardiovascular: Normal rate, regular rhythm, normal heart sounds and intact distal pulses  No murmur heard  Pulmonary/Chest: Effort normal and breath sounds normal  No respiratory distress  She has no wheezes  She has no rales  Abdominal: Soft  Bowel sounds are normal  She exhibits no distension and no mass  There is no tenderness  There is no rebound and no guarding  Musculoskeletal: She exhibits no edema  Lymphadenopathy:     She has no cervical adenopathy  Neurological: She is alert and oriented to person, place, and time  She has normal reflexes  No cranial nerve deficit  Skin:   A small tiny  seborrheic  keratosis left upper back   Psychiatric: She has a normal mood and affect  Her behavior is normal  Judgment and thought content normal            BMI Counseling: Body mass index is 27 8 kg/m²  The BMI is above normal  Nutrition recommendations include decreasing portion sizes, moderation in carbohydrate intake, increasing intake of lean protein, reducing intake of saturated and trans fat and reducing intake of cholesterol   Exercise recommendations include exercising 3-5 times per week  Patient referred to PCP due to patient being overweight  Tobacco Cessation Counseling: Tobacco cessation counseling was provided   The patient is sincerely urged to quit consumption of tobacco  She is not ready to quit tobacco

## 2020-02-17 NOTE — LETTER
February 17, 2020     Patient: Francisco Aleman   YOB: 1965   Date of Visit: 2/17/2020       To Whom it May Concern:    Yovana Lopez is under my professional care  She was seen in my office on 2/17/2020  She was seen for a Physical Exam     If you have any questions or concerns, please don't hesitate to call  Sincerely,          Ирина Alberto MD        CC: Aminata Lopez

## 2020-03-22 DIAGNOSIS — E03.9 HYPOTHYROIDISM, UNSPECIFIED TYPE: ICD-10-CM

## 2020-03-23 RX ORDER — LEVOTHYROXINE SODIUM 0.07 MG/1
TABLET ORAL
Qty: 90 TABLET | Refills: 1 | Status: SHIPPED | OUTPATIENT
Start: 2020-03-23 | End: 2020-09-24

## 2020-03-30 DIAGNOSIS — N95.1 HOT FLASHES DUE TO MENOPAUSE: ICD-10-CM

## 2020-04-16 ENCOUNTER — TELEMEDICINE (OUTPATIENT)
Dept: FAMILY MEDICINE CLINIC | Facility: CLINIC | Age: 55
End: 2020-04-16
Payer: COMMERCIAL

## 2020-04-16 DIAGNOSIS — Z20.822 EXPOSURE TO COVID-19 VIRUS: Primary | ICD-10-CM

## 2020-04-16 PROCEDURE — 99201 PR OFFICE OUTPATIENT NEW 10 MINUTES: CPT | Performed by: FAMILY MEDICINE

## 2020-08-14 ENCOUNTER — TELEPHONE (OUTPATIENT)
Dept: OBGYN CLINIC | Facility: CLINIC | Age: 55
End: 2020-08-14

## 2020-08-14 NOTE — TELEPHONE ENCOUNTER
Patient called  Has appointment scheduled for her Yearly exam on 8/20/20  She is c/o hot flashes  She would like to have blood work done to check this prior to her appointment

## 2020-08-14 NOTE — TELEPHONE ENCOUNTER
Left detailed message for patient giving her the choice of stopping her hormones now for a week or waiting to discuss at her appointment next week

## 2020-08-14 NOTE — TELEPHONE ENCOUNTER
Can order [address] and estradiol however this will not be accurate while she is on her hormones    We can discuss this at her visit or she could stop her hormones for a week and have the blood work done

## 2020-08-20 ENCOUNTER — ANNUAL EXAM (OUTPATIENT)
Dept: OBGYN CLINIC | Facility: CLINIC | Age: 55
End: 2020-08-20
Payer: COMMERCIAL

## 2020-08-20 VITALS
DIASTOLIC BLOOD PRESSURE: 80 MMHG | SYSTOLIC BLOOD PRESSURE: 120 MMHG | TEMPERATURE: 97.4 F | HEIGHT: 72 IN | BODY MASS INDEX: 29.8 KG/M2 | WEIGHT: 220 LBS

## 2020-08-20 DIAGNOSIS — Z12.31 ENCOUNTER FOR SCREENING MAMMOGRAM FOR BREAST CANCER: ICD-10-CM

## 2020-08-20 DIAGNOSIS — Z01.419 ENCOUNTER FOR ANNUAL ROUTINE GYNECOLOGICAL EXAMINATION: Primary | ICD-10-CM

## 2020-08-20 DIAGNOSIS — N95.2 VAGINAL ATROPHY: ICD-10-CM

## 2020-08-20 DIAGNOSIS — N95.1 HOT FLASHES DUE TO MENOPAUSE: ICD-10-CM

## 2020-08-20 PROCEDURE — 99396 PREV VISIT EST AGE 40-64: CPT | Performed by: OBSTETRICS & GYNECOLOGY

## 2020-08-20 PROCEDURE — 3008F BODY MASS INDEX DOCD: CPT | Performed by: OBSTETRICS & GYNECOLOGY

## 2020-08-20 PROCEDURE — 1036F TOBACCO NON-USER: CPT | Performed by: OBSTETRICS & GYNECOLOGY

## 2020-08-20 RX ORDER — ESTRADIOL 0.1 MG/G
1 CREAM VAGINAL 2 TIMES WEEKLY
Qty: 42.5 G | Refills: 1 | Status: SHIPPED | OUTPATIENT
Start: 2020-08-20

## 2020-08-20 NOTE — PROGRESS NOTES
Assessment/Plan:    pap is up to date    mammogram reviewed with her including breast density  3D mammogram ordered so she can schedule this    Discussed self breast exams    Perimenopause - she will continue with her Climara and Prometrium  I explained that I am not sure that an increase in her Climara would help with weight loss and may make it more difficult to lose weight  She is still having hot flashes but she will focus more on her diet as she has been not as careful as she usually it is  We also discussed her menstrual cycles  It is going to be difficult to know when she is in menopause because she has been on hormone replacement therapy for several years due to vasomotor symptoms  She is having minimal bleeding that most likely is a light menstrual cycle but we discussed checking an ultrasound to check her endometrial thickness  She is agreeable  We cannot do an [address] because she would have to stop her hormones prior to having labs done and she prefers not to do this  Vaginal dryness-we discussed lubricants and moisturizer  We also discussed vaginal estrogen and she would like to try this  Prescription for estradiol cream sent to her pharmacy, instructions reviewed  colon cancer screening-colonoscopy done in May 2018, she will return at 5 years  discussed preventive care, regular exercise and a healthy diet, portion control    No problem-specific Assessment & Plan notes found for this encounter  Diagnoses and all orders for this visit:    Encounter for annual routine gynecological examination    Encounter for screening mammogram for breast cancer  -     Mammo screening bilateral w 3d & cad; Future    Hot flashes due to menopause  -     progesterone (PROMETRIUM) 100 MG capsule;  One daily at bedtime  -     estradiol (CLIMARA) 0 025 mg/24 hr; Apply one patch weekly as directed    Vaginal atrophy  -     estradiol (ESTRACE) 0 1 mg/g vaginal cream; Insert 1 g into the vagina 2 (two) times a week          Subjective:      Patient ID: Joey De La Torre is a 50 y o  female  Patient here for yearly  She is on Climara patch and Prometrium for vasomotor symptoms  She has been karl menopausal, the longest she has gone without a cycle 6 months  Her cycles are extremely light, she has 2-3 per year  She still has some hot flashes, usually in the morning  Normal Pap and negative HPV in 2018  Last year, she was called back for an asymmetry after her screening mammogram   Follow-up diagnostic mammogram and ultrasound were normal       She is frustrated over difficulty losing weight  She is exercising regularly  She questions if an increase in her dose of estrogen would help with weight loss  Also she is having vaginal dryness and would like to know what she could do for this  She does use lubricant  The following portions of the patient's history were reviewed and updated as appropriate: allergies, current medications, past family history, past medical history, past social history, past surgical history and problem list     Review of Systems   Constitutional: Negative  Gastrointestinal: Negative  Endocrine: Positive for heat intolerance  Genitourinary: Negative  Objective: There were no vitals taken for this visit  Physical Exam  Vitals signs reviewed  Constitutional:       Appearance: She is well-developed  Neck:      Thyroid: No thyromegaly  Trachea: No tracheal deviation  Cardiovascular:      Rate and Rhythm: Normal rate and regular rhythm  Pulmonary:      Effort: Pulmonary effort is normal       Breath sounds: Normal breath sounds  Chest:      Breasts: Breasts are symmetrical          Right: No inverted nipple, mass, nipple discharge, skin change or tenderness  Left: No inverted nipple, mass, nipple discharge, skin change or tenderness  Abdominal:      General: There is no distension  Palpations: Abdomen is soft  There is no mass  Tenderness: There is no abdominal tenderness  Genitourinary:     Labia:         Right: No rash, tenderness, lesion or injury  Left: No rash, tenderness, lesion or injury  Vagina: Normal       Cervix: No cervical motion tenderness, discharge or friability  Adnexa:         Right: No mass, tenderness or fullness  Left: No mass, tenderness or fullness          Comments: Declines rectal

## 2020-09-23 DIAGNOSIS — E03.9 HYPOTHYROIDISM, UNSPECIFIED TYPE: ICD-10-CM

## 2020-09-24 RX ORDER — LEVOTHYROXINE SODIUM 0.07 MG/1
TABLET ORAL
Qty: 90 TABLET | Refills: 0 | Status: SHIPPED | OUTPATIENT
Start: 2020-09-24 | End: 2020-10-26 | Stop reason: SDUPTHER

## 2020-09-29 ENCOUNTER — ULTRASOUND (OUTPATIENT)
Dept: OBGYN CLINIC | Facility: CLINIC | Age: 55
End: 2020-09-29
Payer: COMMERCIAL

## 2020-09-29 DIAGNOSIS — N93.9 ABNORMAL UTERINE BLEEDING (AUB): Primary | ICD-10-CM

## 2020-09-29 PROCEDURE — 76830 TRANSVAGINAL US NON-OB: CPT

## 2020-09-29 NOTE — PROGRESS NOTES
AMB US Pelvic Non OB    Date/Time: 9/29/2020 8:05 AM  Performed by: Sravanthi Rascon  Authorized by: Robert Pascual DO     Procedure details:     Technique:  Transvaginal US, Non-OB    Position: lithotomy exam    Uterine findings:     Length (cm): 8 2    Height (cm):  4 49    Width (cm):  5 35    Endometrial stripe: identified      Endometrium thickness (mm):  14 9  Left ovary findings:     Left ovary:  Visualized    Length (cm): 2 77    Height (cm): 1 59    Width (cm): 1 41  Right ovary findings:     Right ovary:  Visualized    Length (cm): 2 8    Height (cm): 1 33    Width (cm): 1 53  Other findings:     Free pelvic fluid: not identified      Free peritoneal fluid: not identified    Post-Procedure Details:     Impression:  The anteverted uterus and bilateral ovaries appear within normal limits for a menstruating patient  No free fluid  Tolerance: Tolerated well, no immediate complications    Complications: no complications    Additional Procedure Comments:      Panl F8 E8C-RS transvaginal transducer Serial #[id number] was used to perform the examination today and subsequently followed with high level disinfection utilizing Trophon EPR procedure  Ultrasound performed at:     [id number] [address] [address] [facility]  Phone:  [phone]  Fax:  [phone]

## 2020-09-30 NOTE — PROGRESS NOTES
US shows normal uterus and ovaries  Her endometrium is thicker than what I would expect for her light cycles  I would recommend office emb

## 2020-10-15 ENCOUNTER — PROCEDURE VISIT (OUTPATIENT)
Dept: OBGYN CLINIC | Facility: CLINIC | Age: 55
End: 2020-10-15
Payer: COMMERCIAL

## 2020-10-15 VITALS
TEMPERATURE: 97.8 F | SYSTOLIC BLOOD PRESSURE: 118 MMHG | DIASTOLIC BLOOD PRESSURE: 78 MMHG | WEIGHT: 220 LBS | BODY MASS INDEX: 29.84 KG/M2

## 2020-10-15 DIAGNOSIS — R93.89 ENDOMETRIAL THICKENING ON ULTRASOUND: Primary | ICD-10-CM

## 2020-10-15 PROCEDURE — 88305 TISSUE EXAM BY PATHOLOGIST: CPT | Performed by: PATHOLOGY

## 2020-10-15 PROCEDURE — 58100 BIOPSY OF UTERUS LINING: CPT | Performed by: OBSTETRICS & GYNECOLOGY

## 2020-10-23 ENCOUNTER — HOSPITAL ENCOUNTER (OUTPATIENT)
Dept: MAMMOGRAPHY | Facility: CLINIC | Age: 55
Discharge: HOME | End: 2020-10-23
Payer: COMMERCIAL

## 2020-10-23 VITALS — WEIGHT: 220 LBS | BODY MASS INDEX: 31.5 KG/M2 | HEIGHT: 70 IN

## 2020-10-23 DIAGNOSIS — Z12.31 ENCOUNTER FOR SCREENING MAMMOGRAM FOR BREAST CANCER: ICD-10-CM

## 2020-10-23 PROCEDURE — 77063 BREAST TOMOSYNTHESIS BI: CPT

## 2020-10-23 PROCEDURE — 77067 SCR MAMMO BI INCL CAD: CPT

## 2020-10-26 DIAGNOSIS — Z13.220 LIPID SCREENING: Primary | ICD-10-CM

## 2020-10-26 DIAGNOSIS — E03.9 HYPOTHYROIDISM, UNSPECIFIED TYPE: ICD-10-CM

## 2020-10-26 RX ORDER — LEVOTHYROXINE SODIUM 0.07 MG/1
75 TABLET ORAL DAILY
Qty: 90 TABLET | Refills: 0 | Status: SHIPPED | OUTPATIENT
Start: 2020-10-26 | End: 2020-11-11 | Stop reason: SDUPTHER

## 2020-11-10 ENCOUNTER — TELEPHONE (OUTPATIENT)
Dept: FAMILY MEDICINE CLINIC | Facility: CLINIC | Age: 55
End: 2020-11-10

## 2020-11-11 DIAGNOSIS — E03.9 HYPOTHYROIDISM, UNSPECIFIED TYPE: ICD-10-CM

## 2020-11-11 RX ORDER — LEVOTHYROXINE SODIUM 0.07 MG/1
75 TABLET ORAL DAILY
Qty: 90 TABLET | Refills: 0 | Status: SHIPPED | OUTPATIENT
Start: 2020-11-11 | End: 2021-07-26

## 2020-12-26 ENCOUNTER — TELEPHONE (OUTPATIENT)
Dept: PALLIATIVE CARE | Facility: HOSPITAL | Age: 55
End: 2020-12-26

## 2020-12-26 DIAGNOSIS — M54.50 BACK PAIN AT L4-L5 LEVEL: Primary | ICD-10-CM

## 2020-12-26 RX ORDER — TRAMADOL HYDROCHLORIDE 50 MG/1
50 TABLET ORAL EVERY 6 HOURS PRN
Qty: 120 TABLET | Refills: 0 | Status: SHIPPED | OUTPATIENT
Start: 2020-12-26 | End: 2021-04-12

## 2020-12-29 ENCOUNTER — APPOINTMENT (OUTPATIENT)
Dept: LAB | Facility: CLINIC | Age: 55
End: 2020-12-29
Payer: COMMERCIAL

## 2020-12-29 LAB
ALBUMIN SERPL BCP-MCNC: 3.3 G/DL (ref 3.5–5)
ALP SERPL-CCNC: 60 U/L (ref 46–116)
ALT SERPL W P-5'-P-CCNC: 21 U/L (ref 12–78)
ANION GAP SERPL CALCULATED.3IONS-SCNC: 5 MMOL/L (ref 4–13)
AST SERPL W P-5'-P-CCNC: 13 U/L (ref 5–45)
BASOPHILS # BLD AUTO: 0.04 THOUSANDS/ΜL (ref 0–0.1)
BASOPHILS NFR BLD AUTO: 1 % (ref 0–1)
BILIRUB SERPL-MCNC: 0.47 MG/DL (ref 0.2–1)
BUN SERPL-MCNC: 18 MG/DL (ref 5–25)
CALCIUM ALBUM COR SERPL-MCNC: 9.7 MG/DL (ref 8.3–10.1)
CALCIUM SERPL-MCNC: 9.1 MG/DL (ref 8.3–10.1)
CHLORIDE SERPL-SCNC: 108 MMOL/L (ref 100–108)
CHOLEST SERPL-MCNC: 201 MG/DL (ref 50–200)
CO2 SERPL-SCNC: 27 MMOL/L (ref 21–32)
CREAT SERPL-MCNC: 0.78 MG/DL (ref 0.6–1.3)
EOSINOPHIL # BLD AUTO: 0.13 THOUSAND/ΜL (ref 0–0.61)
EOSINOPHIL NFR BLD AUTO: 3 % (ref 0–6)
ERYTHROCYTE [DISTWIDTH] IN BLOOD BY AUTOMATED COUNT: 11.9 % (ref 11.6–15.1)
GFR SERPL CREATININE-BSD FRML MDRD: 87 ML/MIN/1.73SQ M
GLUCOSE P FAST SERPL-MCNC: 81 MG/DL (ref 65–99)
HCT VFR BLD AUTO: 45.3 % (ref 34.8–46.1)
HDLC SERPL-MCNC: 52 MG/DL
HGB BLD-MCNC: 14.4 G/DL (ref 11.5–15.4)
IMM GRANULOCYTES # BLD AUTO: 0.01 THOUSAND/UL (ref 0–0.2)
IMM GRANULOCYTES NFR BLD AUTO: 0 % (ref 0–2)
LDLC SERPL CALC-MCNC: 133 MG/DL (ref 0–100)
LYMPHOCYTES # BLD AUTO: 1.57 THOUSANDS/ΜL (ref 0.6–4.47)
LYMPHOCYTES NFR BLD AUTO: 30 % (ref 14–44)
MCH RBC QN AUTO: 31.2 PG (ref 26.8–34.3)
MCHC RBC AUTO-ENTMCNC: 31.8 G/DL (ref 31.4–37.4)
MCV RBC AUTO: 98 FL (ref 82–98)
MONOCYTES # BLD AUTO: 0.44 THOUSAND/ΜL (ref 0.17–1.22)
MONOCYTES NFR BLD AUTO: 8 % (ref 4–12)
NEUTROPHILS # BLD AUTO: 3.08 THOUSANDS/ΜL (ref 1.85–7.62)
NEUTS SEG NFR BLD AUTO: 58 % (ref 43–75)
NONHDLC SERPL-MCNC: 149 MG/DL
NRBC BLD AUTO-RTO: 0 /100 WBCS
PLATELET # BLD AUTO: 265 THOUSANDS/UL (ref 149–390)
PMV BLD AUTO: 9.5 FL (ref 8.9–12.7)
POTASSIUM SERPL-SCNC: 4.1 MMOL/L (ref 3.5–5.3)
PROT SERPL-MCNC: 6.9 G/DL (ref 6.4–8.2)
RBC # BLD AUTO: 4.62 MILLION/UL (ref 3.81–5.12)
SODIUM SERPL-SCNC: 140 MMOL/L (ref 136–145)
TRIGL SERPL-MCNC: 81 MG/DL
TSH SERPL DL<=0.05 MIU/L-ACNC: 3.06 UIU/ML (ref 0.36–3.74)
WBC # BLD AUTO: 5.27 THOUSAND/UL (ref 4.31–10.16)

## 2020-12-29 PROCEDURE — 85025 COMPLETE CBC W/AUTO DIFF WBC: CPT | Performed by: INTERNAL MEDICINE

## 2020-12-29 PROCEDURE — 80061 LIPID PANEL: CPT | Performed by: INTERNAL MEDICINE

## 2020-12-29 PROCEDURE — 84443 ASSAY THYROID STIM HORMONE: CPT | Performed by: INTERNAL MEDICINE

## 2020-12-29 PROCEDURE — 36415 COLL VENOUS BLD VENIPUNCTURE: CPT | Performed by: INTERNAL MEDICINE

## 2020-12-29 PROCEDURE — 80053 COMPREHEN METABOLIC PANEL: CPT | Performed by: INTERNAL MEDICINE

## 2020-12-30 ENCOUNTER — TELEPHONE (OUTPATIENT)
Dept: PALLIATIVE MEDICINE | Facility: CLINIC | Age: 55
End: 2020-12-30

## 2021-01-12 ENCOUNTER — TELEPHONE (OUTPATIENT)
Dept: FAMILY MEDICINE CLINIC | Facility: CLINIC | Age: 56
End: 2021-01-12

## 2021-01-12 NOTE — TELEPHONE ENCOUNTER
Patient aware of lab results and has an appt in Feb to f/u with Dr Nilo Killian  Patient reports to not having the healthiest diet right now and has begun to watch her calorie intake to help lose weight   Patient will review in detail with Dr Nilo Killian at her next visit

## 2021-01-12 NOTE — TELEPHONE ENCOUNTER
Pt requesting Lamine Cao to review her lab results  This are available in patients chart please advise thank you

## 2021-02-09 DIAGNOSIS — N95.2 VAGINAL ATROPHY: ICD-10-CM

## 2021-02-09 RX ORDER — ESTRADIOL 0.1 MG/G
1 CREAM VAGINAL 2 TIMES WEEKLY
Qty: 42.5 G | Refills: 1 | OUTPATIENT
Start: 2021-02-11

## 2021-02-22 ENCOUNTER — OFFICE VISIT (OUTPATIENT)
Dept: FAMILY MEDICINE CLINIC | Facility: CLINIC | Age: 56
End: 2021-02-22
Payer: COMMERCIAL

## 2021-02-22 VITALS
TEMPERATURE: 97.1 F | OXYGEN SATURATION: 98 % | DIASTOLIC BLOOD PRESSURE: 80 MMHG | HEART RATE: 66 BPM | WEIGHT: 238 LBS | SYSTOLIC BLOOD PRESSURE: 120 MMHG | BODY MASS INDEX: 34.07 KG/M2 | HEIGHT: 70 IN | RESPIRATION RATE: 18 BRPM

## 2021-02-22 DIAGNOSIS — Z00.00 ANNUAL PHYSICAL EXAM: ICD-10-CM

## 2021-02-22 PROCEDURE — 3725F SCREEN DEPRESSION PERFORMED: CPT | Performed by: INTERNAL MEDICINE

## 2021-02-22 PROCEDURE — 99396 PREV VISIT EST AGE 40-64: CPT | Performed by: INTERNAL MEDICINE

## 2021-02-22 NOTE — PROGRESS NOTES
Assessment/Plan:         Diagnoses and all orders for this visit:  Annual physical exam   recent lab studies were discussed with patient  Patient up-to-date with mammogram and colonoscopy  BMI 34 0-34 9,adult  -     liraglutide (SAXENDA) injection; Inject 0 1 mL (0 6 mg total) under the skin daily   no signs of other endocrinopathy explain weight gain  Her TSH has been stable  However would consult endocrinology for their opinion        Subjective:      Patient ID: Perez Farfan is a 50 y o  female  HPI   patient is here for annual physical   She is quite frustrated with her inability to lose weight  She is working out regularly including walking on treadmill, aerobic activity strength training and locking her caloric intake  Right now she is on 1600 caloric  Restricted diet  Has been unable to lose weight  We discussed different treatment options  We will give it a try with Saxenda  Patient has no personal or family history of thyroid or pancreatic malignancy  Thyroid ultrasound 2019 did not show any abnormality  The following portions of the patient's history were reviewed and updated as appropriate: allergies, current medications, past family history, past medical history, past social history, past surgical history and problem list     Review of Systems   Constitutional: Positive for unexpected weight change  Negative for appetite change, chills, fatigue and fever  HENT: Negative for congestion, hearing loss, postnasal drip, trouble swallowing and voice change  Eyes: Negative for pain and visual disturbance  Respiratory: Negative for cough, chest tightness and shortness of breath  Cardiovascular: Negative for chest pain, palpitations and leg swelling  Gastrointestinal: Negative for abdominal pain, blood in stool, constipation, diarrhea, nausea and vomiting  Endocrine: Negative for cold intolerance, heat intolerance, polydipsia and polyphagia     Genitourinary: Negative for difficulty urinating, flank pain, frequency and hematuria  Musculoskeletal: Negative for arthralgias, back pain, gait problem, joint swelling and myalgias  Skin: Negative for rash  Neurological: Negative for dizziness, weakness, light-headedness, numbness and headaches  Hematological: Negative for adenopathy  Does not bruise/bleed easily  Psychiatric/Behavioral: Negative for confusion, dysphoric mood and sleep disturbance  Objective:      /80 (BP Location: Left arm, Patient Position: Sitting, Cuff Size: Adult)   Pulse 66   Temp (!) 97 1 °F (36 2 °C)   Resp 18   Ht 5' 10" (1 778 m)   Wt 108 kg (238 lb)   SpO2 98%   BMI 34 15 kg/m²          Physical Exam  Constitutional:       General: She is not in acute distress  Appearance: She is well-developed  HENT:      Head: Normocephalic  Mouth/Throat:      Pharynx: No oropharyngeal exudate  Eyes:      General: No scleral icterus  Conjunctiva/sclera: Conjunctivae normal    Neck:      Thyroid: No thyromegaly  Cardiovascular:      Rate and Rhythm: Normal rate and regular rhythm  Heart sounds: Normal heart sounds  No murmur  Pulmonary:      Effort: Pulmonary effort is normal  No respiratory distress  Breath sounds: Normal breath sounds  No wheezing or rales  Abdominal:      General: Bowel sounds are normal       Palpations: Abdomen is soft  Tenderness: There is no abdominal tenderness  There is no rebound  Lymphadenopathy:      Cervical: No cervical adenopathy  Skin:     General: Skin is warm  Neurological:      Mental Status: She is alert and oriented to person, place, and time  Cranial Nerves: No cranial nerve deficit  Deep Tendon Reflexes: Reflexes are normal and symmetric  Psychiatric:         Behavior: Behavior normal          Thought Content: Thought content normal          Judgment: Judgment normal              BMI Counseling: Body mass index is 34 15 kg/m²   The BMI is above normal  Nutrition recommendations include decreasing portion sizes  Exercise recommendations include vigorous physical activity 75 minutes/week and strength training exercises  Pharmacotherapy was ordered to help aid in weight loss  Depression Screening and Follow-up Plan: Patient's depression screening was positive with a PHQ-2 score of 0  Clincally patient does not have depression  No treatment is required

## 2021-02-25 ENCOUNTER — TELEPHONE (OUTPATIENT)
Dept: FAMILY MEDICINE CLINIC | Facility: CLINIC | Age: 56
End: 2021-02-25

## 2021-02-26 DIAGNOSIS — R73.01 IMPAIRED FASTING GLUCOSE: ICD-10-CM

## 2021-02-26 DIAGNOSIS — R63.5 ABNORMAL WEIGHT GAIN: ICD-10-CM

## 2021-03-24 ENCOUNTER — OFFICE VISIT (OUTPATIENT)
Dept: FAMILY MEDICINE CLINIC | Facility: CLINIC | Age: 56
End: 2021-03-24
Payer: COMMERCIAL

## 2021-03-24 VITALS
TEMPERATURE: 97 F | HEIGHT: 70 IN | RESPIRATION RATE: 18 BRPM | WEIGHT: 230 LBS | OXYGEN SATURATION: 98 % | DIASTOLIC BLOOD PRESSURE: 80 MMHG | BODY MASS INDEX: 32.93 KG/M2 | SYSTOLIC BLOOD PRESSURE: 110 MMHG | HEART RATE: 68 BPM

## 2021-03-24 DIAGNOSIS — R73.01 IMPAIRED FASTING GLUCOSE: Primary | ICD-10-CM

## 2021-03-24 PROCEDURE — 1036F TOBACCO NON-USER: CPT | Performed by: INTERNAL MEDICINE

## 2021-03-24 PROCEDURE — 99212 OFFICE O/P EST SF 10 MIN: CPT | Performed by: INTERNAL MEDICINE

## 2021-03-24 PROCEDURE — 3008F BODY MASS INDEX DOCD: CPT | Performed by: INTERNAL MEDICINE

## 2021-03-25 NOTE — PROGRESS NOTES
Assessment/Plan:         Diagnoses and all orders for this visit:    Impaired fasting glucose    BMI 33 0-33 9,adult     Continue with Saxenda another 4 months    Subjective:      Patient ID: Shameka Schwarz is a 50 y o  female  HPI   patient is follow-up on recent initiation of medication of Saxenda  Patient is on 1 6 mg of Saxenda and reports 8 lb weight loss over short period of time  She does not report any abdominal cramps dizziness nausea vomiting  She reports no trouble with injection site reactions  The following portions of the patient's history were reviewed and updated as appropriate: allergies, current medications, past family history, past medical history, past social history, past surgical history and problem list     Review of Systems   Constitutional: Positive for appetite change and unexpected weight change  Objective:      /80 (BP Location: Left arm, Patient Position: Sitting, Cuff Size: Adult)   Pulse 68   Temp (!) 97 °F (36 1 °C)   Resp 18   Ht 5' 10" (1 778 m)   Wt 104 kg (230 lb)   SpO2 98%   BMI 33 00 kg/m²          Physical Exam  Chest:      Chest wall: Tenderness (  Left lower ribcage discomfort on palpation) present  Abdominal:      General: There is no distension  Tenderness: There is no abdominal tenderness  There is no guarding

## 2021-04-08 DIAGNOSIS — Z23 ENCOUNTER FOR IMMUNIZATION: ICD-10-CM

## 2021-04-10 DIAGNOSIS — N95.2 VAGINAL ATROPHY: ICD-10-CM

## 2021-04-10 RX ORDER — ESTRADIOL 0.1 MG/G
1 CREAM VAGINAL 2 TIMES WEEKLY
Qty: 42.5 G | Refills: 1 | OUTPATIENT
Start: 2021-04-12

## 2021-04-12 ENCOUNTER — APPOINTMENT (OUTPATIENT)
Dept: LAB | Facility: CLINIC | Age: 56
End: 2021-04-12
Payer: COMMERCIAL

## 2021-04-12 ENCOUNTER — CONSULT (OUTPATIENT)
Dept: ENDOCRINOLOGY | Facility: CLINIC | Age: 56
End: 2021-04-12
Payer: COMMERCIAL

## 2021-04-12 VITALS
BODY MASS INDEX: 30.61 KG/M2 | WEIGHT: 226 LBS | SYSTOLIC BLOOD PRESSURE: 118 MMHG | DIASTOLIC BLOOD PRESSURE: 80 MMHG | HEART RATE: 82 BPM | HEIGHT: 72 IN

## 2021-04-12 DIAGNOSIS — E03.9 HYPOTHYROIDISM, UNSPECIFIED TYPE: Primary | ICD-10-CM

## 2021-04-12 DIAGNOSIS — R63.5 ABNORMAL WEIGHT GAIN: ICD-10-CM

## 2021-04-12 DIAGNOSIS — E03.9 HYPOTHYROIDISM, UNSPECIFIED TYPE: ICD-10-CM

## 2021-04-12 LAB
EST. AVERAGE GLUCOSE BLD GHB EST-MCNC: 103 MG/DL
HBA1C MFR BLD: 5.2 %
TSH SERPL DL<=0.05 MIU/L-ACNC: 1.25 UIU/ML (ref 0.36–3.74)

## 2021-04-12 PROCEDURE — 84443 ASSAY THYROID STIM HORMONE: CPT

## 2021-04-12 PROCEDURE — 3008F BODY MASS INDEX DOCD: CPT | Performed by: INTERNAL MEDICINE

## 2021-04-12 PROCEDURE — 83036 HEMOGLOBIN GLYCOSYLATED A1C: CPT

## 2021-04-12 PROCEDURE — 99203 OFFICE O/P NEW LOW 30 MIN: CPT | Performed by: INTERNAL MEDICINE

## 2021-04-12 PROCEDURE — 36415 COLL VENOUS BLD VENIPUNCTURE: CPT

## 2021-04-12 NOTE — PROGRESS NOTES
Chief Complaint   Patient presents with    inability to lose weight      Referring Provider  Connor Varner, [address] [facility] [address] Pearson  [place],  [address] [facility]     History of Present Illness:   Noel Archibald is a 50 y o  female with hypothyroidism and weight gain seen in consultation at the request of Dr Connor Varner  She is taking levothyroxine 75mcg daily, which has been titrated up by Dr Erick Gurrola though not recently changed  She is most concern about her diabetes risk related to her weight  The weight she has gained seems to be mostly in the mid-section  She reports trying metformin in the past at Dr Theoplis Fabry direction  She is not aware of her blood glucose being elevated on prior testing  She has not been pregnant, so no hx of gestational diabetes  She recalls seeing Weight management in the past, who did  on diet  Her current exercise regimen is a virtual method called "Supernatural," which she has enjoyed using  She has used trainers in the past, and plans to add more strength training into her regimen  Her diet now is "dirty keto" with 15% of intake is carbs  She is using an jen to track this  There is also using a device called a "lumen" which she blows air into each morning, which may be a metabolic assessment device  Her menopausal status  She is still getting periods occasionally  Her OBGYN has started on a very low dose of estrogen  She is not on a higher dose after discussion with her OB  Her sleep is somewhat disrupted, which is typically related to her body temperature  She does feel well in the morning, and does not believe she has leg kicking or snoring  Overall she feels her sleep is good  Additional stressors this year include the pandemic in general, but also losing her mother to [facility] in April 2020  Her work schedule includes travel including to [place]  She denies changes in her skin, acne, hair loss, or loss of muscle mass   She does feel "less strong "       Fhx:   diabetes- mother, brother, [address] [facility]    Patient Active Problem List   Diagnosis    Perimenopausal symptoms    Abnormal weight gain    Overweight    Impaired fasting glucose    Colon cancer screening    Gastroesophageal reflux disease without esophagitis    Exposure to COVID-19 virus    Hypothyroidism      Past Medical History:   Diagnosis Date    Abnormal Pap smear of cervix     Acute upper respiratory infection     Allergic rhinitis     Amenorrhea     Obesity     Perimenopausal symptoms     PONV (postoperative nausea and vomiting)       Past Surgical History:   Procedure Laterality Date    COLONOSCOPY N/A 5/22/2018    Procedure: COLONOSCOPY;  Surgeon: Ashok Calvo MD;  Location: [facility];   Service: Gastroenterology    MOUTH SURGERY      TONSILLECTOMY      TOOTH EXTRACTION        Family History   Problem Relation Age of Onset    Heart disease Father     Heart attack Father         myocardial infarction    Other Father         cardiac disorder    Arthritis Maternal Grandmother     Heart disease Maternal Grandmother     Other Maternal Grandmother         cardiac disorder    No Known Problems Mother         Well    No Known Problems Paternal Grandmother     No Known Problems Half-Sister     No Known Problems Half-Sister     No Known Problems Half-Sister     Cancer Maternal Aunt 28        Vulvular     No Known Problems Maternal Grandfather     No Known Problems Paternal Grandfather     No Known Problems Half-Brother     No Known Problems Half-Brother     No Known Problems Half-Brother     No Known Problems Half-Brother     No Known Problems Maternal Uncle      Social History     Tobacco Use    Smoking status: Former Smoker    Smokeless tobacco: Current User   Substance Use Topics    Alcohol use: Yes     Frequency: 2-4 times a month     Drinks per session: 1 or 2     Binge frequency: Never     Comment: social alcohol use     No Known Allergies      Current Outpatient Medications:    cholecalciferol (VITAMIN D3) 1,000 units tablet, Take 1,000 Units by mouth daily, Disp: , Rfl:     estradiol (CLIMARA) 0 025 mg/24 hr, Apply one patch weekly as directed, Disp: 12 patch, Rfl: 3    estradiol (ESTRACE) 0 1 mg/g vaginal cream, Insert 1 g into the vagina 2 (two) times a week, Disp: 42 5 g, Rfl: 1    Insulin Pen Needle 32G X 4 MM MISC, Use once daily, Disp: 100 each, Rfl: 1    levothyroxine 75 mcg tablet, Take 1 tablet (75 mcg total) by mouth daily, Disp: 90 tablet, Rfl: 0    liraglutide (SAXENDA) injection, Inject 0 5 mL (3 mg total) under the skin daily, Disp: 5 pen, Rfl: 3    Misc Natural Products (FIBER 7 PO), Take by mouth, Disp: , Rfl:     multivitamin (THERAGRAN) TABS, Take 1 tablet by mouth daily, Disp: , Rfl:     Probiotic Product (PROBIOTIC-10 PO), Take by mouth, Disp: , Rfl:     progesterone (PROMETRIUM) 100 MG capsule, One daily at bedtime, Disp: 90 capsule, Rfl: 3  Review of Systems   Constitutional: Positive for unexpected weight change (see HPI)  Negative for fatigue  HENT: Negative for hearing loss, trouble swallowing and voice change  Eyes: Negative for visual disturbance  Respiratory: Negative for cough and shortness of breath  Cardiovascular: Negative for chest pain and palpitations  Gastrointestinal: Negative for constipation, diarrhea and nausea  Genitourinary: Negative for menstrual problem  Skin: Negative for color change  Denies acne or thinning skin   Neurological: Negative for tremors  Hematological: Does not bruise/bleed easily  Psychiatric/Behavioral: Negative for sleep disturbance  The patient is not nervous/anxious  see also HPI    Physical Exam:  Body mass index is 30 65 kg/m²    /80   Pulse 82   Ht 6' (1 829 m)   Wt 103 kg (226 lb)   BMI 30 65 kg/m²    Wt Readings from Last 3 Encounters:   04/12/21 103 kg (226 lb)   03/24/21 104 kg (230 lb)   02/22/21 108 kg (238 lb)       GEN: NAD  E/n/m: mask in place, hearing intact bilat, tongue midline, lips nl  Eyes: no stare or proptosis, nl lids and conjunctiva, EOMI  Neck: trachea midline, thyroid NT to palpation, nl in size, no nodules or neck masses noted, no cervical LAD  CV; heart reg rate s1s2 nl, no m/r/g appreciated  Resp: CTAB, good effort  Ab+BS  Neuro: no tremor, 2+ DTRs in BUE  MS: no c/c in digits, moves all 4 ext, nl muscle bulk, gait nl, strength 5/5 BU and BL prox mm groups  Skin: warm and dry, no palmar erythema, no striae on her abdomen or back  Psych: nl mood and affect, no gross lapses in memory    DATA:  Labs:     Lab Results   Component Value Date    NLO7YIGRRWEC 3 060 12/29/2020         Lab Results   Component Value Date    FREET4 0 94 03/11/2019     Lab Results   Component Value Date     08/06/2014    SODIUM 140 12/29/2020    K 4 1 12/29/2020     12/29/2020    CO2 27 12/29/2020    ANIONGAP 7 08/06/2014    AGAP 5 12/29/2020    BUN 18 12/29/2020    CREATININE 0 78 12/29/2020    GLUF 81 12/29/2020    CALCIUM 9 1 12/29/2020    AST 13 12/29/2020    ALT 21 12/29/2020    ALKPHOS 60 12/29/2020    TP 6 9 12/29/2020    TBILI 0 47 12/29/2020    EGFR 87 12/29/2020     Lab Results   Component Value Date    HGBA1C 5 2 01/06/2018         Radiology    Impression:  1  Hypothyroidism, unspecified type    2  BMI 34 0-34 9,adult    3  Abnormal weight gain           Plan:    [place] was seen today for inability to lose weight  Diagnoses and all orders for this visit:    Hypothyroidism, unspecified type  -     TSH, 3rd generation with Free T4 reflex; Future    BMI 34 0-34 9,adult  -     Ambulatory referral to Endocrinology  -     Cancel: Hemoglobin A1C; Future  -     Hemoglobin A1C; Future    Abnormal weight gain  -     Cancel: Hemoglobin A1C; Future  -     Hemoglobin A1C; Future      1  BMI 34 0-34 9,adult    - Ambulatory referral to Endocrinology    1  Inability to lose weight, diabetes risk: She is concerned as she does not want to develop diabetes   She asks if anything else could be done, but I reassured her that her diet and activity are excellent ways to reduce risk of diabetes development  She has not had a recent a1c, though her fasting glucoses have been in the 80s  I offered to check this  We discussed, and she is aware, that weight is multifactorial including her age, post-menopausal status, and genetics  She has no features suggestive of hypercortisolemia  I do not recommend any other endocrine testing for her weight  2  Hypothyroidism: She is taking the levothyroxine 75mcg daily  I offered to check this as some women do need dose adjustment with menopause  If a dose adjustment is needed, the will decide if should would like to follow with endocrine or with Dr Bernarda Andrade      Discussed with the patient and all questioned fully answered  She will call me if any problems arise          Fanny Kinney MD

## 2021-04-12 NOTE — TELEPHONE ENCOUNTER
Regarding: RE: Prescription Question  Contact: [phone]  Hello, I haven't had any problems on the medication and I am on the last pen  I called the pharmacy to refill the prescription and they say that I'm not eligible because of the dosage that was prescribed until May  I explained that I was instructed to go up in the dosage gradually, but they said that's not what the prescription says  Nino Aguilar so the pharmacy needs to hear from Dr Kvng Feliciano with a new prescription  Btw I am down 12 pounds and continue working at it  Thanks     ----- Message -----  From: Nghia Padilla MA  Sent: 3/7/21, 6:51 AM  To: Stella Ku  Subject: RE: Prescription Question    Theronradha Timura,    As long as your not having any sideeffects you can move your dosage up weekly  If you find you are having some GI issues just lower the dosage for a few days before going back up to your regular dosage  I hope your doing well and I know your going to great! Keep reaching our if you have any questions  Sofi Arevalo       ----- Message -----       From:Krystal Gonsales       Sent:3/4/2021  8:52 AM EST         To:Shelley Luna MD    Subject:RE: Prescription Question    Hello, I have a question  Am I supposed to stay at  6 or am I supposed to increase after the first week? That's what the directions say but my prescription says   6  I'm not having any issues, still working out and eating a keto diet  Let me know  Thanks :-)      ----- Message -----       From:Jazzy Graff MA       Sent:2/25/2021  6:32 AM EST         To:Krystal Gonsales    Subject:RE: Prescription Question    Erika Velazquez,    This is perfect! Exactly the information I needed  I will let you know once I get this approved  Thanks!   Sofi Arevalo      ----- Message -----       From:Krystal Gonsales       Sent:2/24/2021  8:39 AM EST         To:Shelley Luna MD    Subject:RE: Prescription Question    I am trying to stick to 1600 marcie a day and locking them on the lose it jen      ----- Message -----       From:Jazzy Bar MA       Sent:2/24/2021  6:22 AM EST         To:Krystal Neal    Subject:RE: Prescription Question    Loann Pretzel Morning! [facility]? I don't see anything from your pharmacy indicating that you need a prior-auth but I'm going to assume that is what you are checking on? I can help you with the prior-auth, I just need to ask a few other questions  Have you ever tried any other type of weight loss medication? (most insurances require that you have tried and failed at least 2 different weight loss medications)    What modifications have you/will you make at home/work to help with your weight loss? (insurance is looking for healthy diet and exercise being used along with this medication)    I will start working on this today for you, please also go to the [employer]  There is a co-pay card that is very helpful once I get this approved for you  You just take it to the pharmacy you will have this medication filled at and it lowers your co-pay out of pocket cost      Thanks! Jerrell Rivera       ----- Message -----       From:Krystal Neal       Sent:2/23/2021  5:47 PM EST         To:Shelley Sharpe MD    Subject:Prescription Question    Just checking on the prescription  The pharmacy says the insurance company wants to make sure I need the medication  It's $3000 a month without insurance  I'm hoping they go ahead and approve it so I can get back to a  healthy weight and not get diabetes  Let me know  Thank you

## 2021-06-30 ENCOUNTER — OFFICE VISIT (OUTPATIENT)
Dept: FAMILY MEDICINE CLINIC | Facility: CLINIC | Age: 56
End: 2021-06-30
Payer: COMMERCIAL

## 2021-06-30 VITALS
TEMPERATURE: 97.3 F | DIASTOLIC BLOOD PRESSURE: 62 MMHG | OXYGEN SATURATION: 98 % | BODY MASS INDEX: 29.53 KG/M2 | SYSTOLIC BLOOD PRESSURE: 106 MMHG | HEIGHT: 72 IN | HEART RATE: 71 BPM | WEIGHT: 218 LBS

## 2021-06-30 DIAGNOSIS — Z13.21 ENCOUNTER FOR VITAMIN DEFICIENCY SCREENING: ICD-10-CM

## 2021-06-30 DIAGNOSIS — Z82.49 FAMILY HISTORY OF CORONARY ARTERY DISEASE: ICD-10-CM

## 2021-06-30 DIAGNOSIS — Z13.220 LIPID SCREENING: ICD-10-CM

## 2021-06-30 DIAGNOSIS — Z87.891 FORMER SMOKER: ICD-10-CM

## 2021-06-30 DIAGNOSIS — R07.9 CHEST PAIN, UNSPECIFIED TYPE: Primary | ICD-10-CM

## 2021-06-30 DIAGNOSIS — E03.9 HYPOTHYROIDISM, UNSPECIFIED TYPE: ICD-10-CM

## 2021-06-30 PROCEDURE — 3725F SCREEN DEPRESSION PERFORMED: CPT | Performed by: INTERNAL MEDICINE

## 2021-06-30 PROCEDURE — 1036F TOBACCO NON-USER: CPT | Performed by: INTERNAL MEDICINE

## 2021-06-30 PROCEDURE — 99214 OFFICE O/P EST MOD 30 MIN: CPT | Performed by: INTERNAL MEDICINE

## 2021-06-30 PROCEDURE — 3008F BODY MASS INDEX DOCD: CPT | Performed by: INTERNAL MEDICINE

## 2021-07-01 NOTE — PROGRESS NOTES
Assessment/Plan:         Diagnoses and all orders for this visit:    Chest pain, unspecified type  -     CT chest wo contrast; Future    Former smoker  -     CT chest wo contrast; Future    Hypothyroidism, unspecified type  -     TSH, 3rd generation with Free T4 reflex    Lipid screening  -     CBC and differential  -     Comprehensive metabolic panel  -     Lipid panel    Family history of coronary artery disease  -     Comprehensive metabolic panel  -     Lipid panel    Encounter for vitamin deficiency screening  -     Vitamin D 25 hydroxy        Subjective:      Patient ID: Tamiko Irving is a 49 y o  female  HPI   patient history of hypothyroidism high BMI is here to discuss health status  She has lost 25 lb altogether on Saxenda as well as aggressive workup  She feels good overall however wishes to lose more weight  She is due for repeat lab studies  Last TSH in April was good  Her last cholesterol somewhat on the higher side today  She does mention left lower ribcage discomfort  It tends to wraparound her lower rib goes to the back  Remote history of smoking  Does have mild cough  Denies any hemoptysis  Reports no night sweats diaphoresis  Would order a CT scan of the chest   EKG in the office was unremarkable  Once again patient works out  aggressively every day and does not  experience any chest pain  Like to repeat lipid profile as well  Strong family history of premature coronary disease with massive MI in her father  Consider Cardiology consultation and may be coronary calcium score     Following portions of the patient's history were reviewed and updated as appropriate: allergies, current medications, past family history, past medical history, past social history, past surgical history and problem list     Review of Systems      Objective:      /62   Pulse 71   Temp (!) 97 3 °F (36 3 °C)   Ht 6' (1 829 m)   Wt 98 9 kg (218 lb)   SpO2 98%   BMI 29 57 kg/m² Physical Exam  Neck:      Vascular: No carotid bruit  Cardiovascular:      Rate and Rhythm: Normal rate and regular rhythm  Heart sounds: Normal heart sounds  No murmur heard  Pulmonary:      Effort: Pulmonary effort is normal  No respiratory distress  Breath sounds: No wheezing or rales  Musculoskeletal:      Right lower leg: No edema  Left lower leg: No edema  Neurological:      Mental Status: She is alert

## 2021-07-25 DIAGNOSIS — E03.9 HYPOTHYROIDISM, UNSPECIFIED TYPE: ICD-10-CM

## 2021-07-26 RX ORDER — LEVOTHYROXINE SODIUM 0.07 MG/1
TABLET ORAL
Qty: 90 TABLET | Refills: 0 | Status: SHIPPED | OUTPATIENT
Start: 2021-07-26 | End: 2021-10-01

## 2021-08-04 ENCOUNTER — APPOINTMENT (OUTPATIENT)
Dept: LAB | Facility: CLINIC | Age: 56
End: 2021-08-04
Payer: COMMERCIAL

## 2021-08-04 LAB
25(OH)D3 SERPL-MCNC: 28.4 NG/ML (ref 30–100)
ALBUMIN SERPL BCP-MCNC: 3.7 G/DL (ref 3.5–5)
ALP SERPL-CCNC: 52 U/L (ref 46–116)
ALT SERPL W P-5'-P-CCNC: 22 U/L (ref 12–78)
ANION GAP SERPL CALCULATED.3IONS-SCNC: 9 MMOL/L (ref 4–13)
AST SERPL W P-5'-P-CCNC: 14 U/L (ref 5–45)
BASOPHILS # BLD AUTO: 0.05 THOUSANDS/ΜL (ref 0–0.1)
BASOPHILS NFR BLD AUTO: 1 % (ref 0–1)
BILIRUB SERPL-MCNC: 0.51 MG/DL (ref 0.2–1)
BUN SERPL-MCNC: 18 MG/DL (ref 5–25)
CALCIUM SERPL-MCNC: 8.8 MG/DL (ref 8.3–10.1)
CHLORIDE SERPL-SCNC: 105 MMOL/L (ref 100–108)
CHOLEST SERPL-MCNC: 194 MG/DL (ref 50–200)
CO2 SERPL-SCNC: 23 MMOL/L (ref 21–32)
CREAT SERPL-MCNC: 0.72 MG/DL (ref 0.6–1.3)
EOSINOPHIL # BLD AUTO: 0.09 THOUSAND/ΜL (ref 0–0.61)
EOSINOPHIL NFR BLD AUTO: 2 % (ref 0–6)
ERYTHROCYTE [DISTWIDTH] IN BLOOD BY AUTOMATED COUNT: 12.1 % (ref 11.6–15.1)
GFR SERPL CREATININE-BSD FRML MDRD: 95 ML/MIN/1.73SQ M
GLUCOSE P FAST SERPL-MCNC: 70 MG/DL (ref 65–99)
HCT VFR BLD AUTO: 44.2 % (ref 34.8–46.1)
HDLC SERPL-MCNC: 56 MG/DL
HGB BLD-MCNC: 14.4 G/DL (ref 11.5–15.4)
IMM GRANULOCYTES # BLD AUTO: 0.01 THOUSAND/UL (ref 0–0.2)
IMM GRANULOCYTES NFR BLD AUTO: 0 % (ref 0–2)
LDLC SERPL CALC-MCNC: 125 MG/DL (ref 0–100)
LYMPHOCYTES # BLD AUTO: 1.74 THOUSANDS/ΜL (ref 0.6–4.47)
LYMPHOCYTES NFR BLD AUTO: 31 % (ref 14–44)
MCH RBC QN AUTO: 32.1 PG (ref 26.8–34.3)
MCHC RBC AUTO-ENTMCNC: 32.6 G/DL (ref 31.4–37.4)
MCV RBC AUTO: 98 FL (ref 82–98)
MONOCYTES # BLD AUTO: 0.37 THOUSAND/ΜL (ref 0.17–1.22)
MONOCYTES NFR BLD AUTO: 7 % (ref 4–12)
NEUTROPHILS # BLD AUTO: 3.32 THOUSANDS/ΜL (ref 1.85–7.62)
NEUTS SEG NFR BLD AUTO: 59 % (ref 43–75)
NONHDLC SERPL-MCNC: 138 MG/DL
NRBC BLD AUTO-RTO: 1 /100 WBCS
PLATELET # BLD AUTO: 262 THOUSANDS/UL (ref 149–390)
PMV BLD AUTO: 9.4 FL (ref 8.9–12.7)
POTASSIUM SERPL-SCNC: 4.1 MMOL/L (ref 3.5–5.3)
PROT SERPL-MCNC: 7.4 G/DL (ref 6.4–8.2)
RBC # BLD AUTO: 4.49 MILLION/UL (ref 3.81–5.12)
SODIUM SERPL-SCNC: 137 MMOL/L (ref 136–145)
TRIGL SERPL-MCNC: 63 MG/DL
TSH SERPL DL<=0.05 MIU/L-ACNC: 2.08 UIU/ML (ref 0.36–3.74)
WBC # BLD AUTO: 5.58 THOUSAND/UL (ref 4.31–10.16)

## 2021-08-04 PROCEDURE — 82306 VITAMIN D 25 HYDROXY: CPT | Performed by: INTERNAL MEDICINE

## 2021-08-04 PROCEDURE — 80061 LIPID PANEL: CPT | Performed by: INTERNAL MEDICINE

## 2021-08-04 PROCEDURE — 85025 COMPLETE CBC W/AUTO DIFF WBC: CPT | Performed by: INTERNAL MEDICINE

## 2021-08-04 PROCEDURE — 36415 COLL VENOUS BLD VENIPUNCTURE: CPT | Performed by: INTERNAL MEDICINE

## 2021-08-04 PROCEDURE — 84443 ASSAY THYROID STIM HORMONE: CPT | Performed by: INTERNAL MEDICINE

## 2021-08-04 PROCEDURE — 80053 COMPREHEN METABOLIC PANEL: CPT | Performed by: INTERNAL MEDICINE

## 2021-08-05 ENCOUNTER — PATIENT MESSAGE (OUTPATIENT)
Dept: FAMILY MEDICINE CLINIC | Facility: CLINIC | Age: 56
End: 2021-08-05

## 2021-08-09 ENCOUNTER — TELEMEDICINE (OUTPATIENT)
Dept: FAMILY MEDICINE CLINIC | Facility: CLINIC | Age: 56
End: 2021-08-09
Payer: COMMERCIAL

## 2021-08-09 ENCOUNTER — TELEPHONE (OUTPATIENT)
Dept: FAMILY MEDICINE CLINIC | Facility: CLINIC | Age: 56
End: 2021-08-09

## 2021-08-09 DIAGNOSIS — E03.9 HYPOTHYROIDISM, UNSPECIFIED TYPE: Primary | ICD-10-CM

## 2021-08-09 DIAGNOSIS — R05.9 COUGH: ICD-10-CM

## 2021-08-09 PROCEDURE — 99213 OFFICE O/P EST LOW 20 MIN: CPT | Performed by: INTERNAL MEDICINE

## 2021-08-09 RX ORDER — PROGESTERONE 100 MG/1
1 CAPSULE ORAL
COMMUNITY
Start: 2021-07-07 | End: 2021-10-01

## 2021-08-09 RX ORDER — BENZONATATE 200 MG/1
200 CAPSULE ORAL 3 TIMES DAILY PRN
Qty: 20 CAPSULE | Refills: 0 | Status: SHIPPED | OUTPATIENT
Start: 2021-08-09 | End: 2022-06-13

## 2021-08-10 ENCOUNTER — TELEPHONE (OUTPATIENT)
Dept: FAMILY MEDICINE CLINIC | Facility: CLINIC | Age: 56
End: 2021-08-10

## 2021-08-10 NOTE — LETTER
August 10, 2021     Patient: Ladonna Castillo   Date of Birth: [birth year]   Date of Visit: 8/09/2021       To Whom it May Concern:    Maria Dolores Gustafson is under my professional care  She was seen in my office on 08/09/2021  She is not feeling well and should work from home for the entire week, starting on 8/9/21 thru 8/13/21  If you have any questions or concerns, please don't hesitate to call           Sincerely,          Mary Jo Nielson MD

## 2021-08-12 NOTE — PROGRESS NOTES
Virtual Brief Visit    Verification of patient location:    Patient is located in the following state in which I hold an active license [place]      Assessment/Plan:    Problem List Items Addressed This Visit        Endocrine    Hypothyroidism - Primary      Other Visit Diagnoses     Cough        Relevant Medications    benzonatate (TESSALON) 200 MG capsule                Reason for visit is   Chief Complaint   Patient presents with    COVID-19    Virtual Brief Visit        Encounter provider Risa Graves MD    Provider located at [address] , [address]  [phone]    Recent Visits  Date Type Provider Dept   08/09/21 Telemedicine Risa Graves MD Pg  Primary Care [facility]   08/09/21 Telephone Risa Graves MD [facility] recent visits within past 7 days and meeting all other requirements  Future Appointments  No visits were found meeting these conditions  Showing future appointments within next 150 days and meeting all other requirements       After connecting through telephone, the patient was identified by name and date of birth  Magy Garcia was informed that this is a telemedicine visit and that the visit is being conducted through telephone  My office door was closed  No one else was in the room  She acknowledged consent and understanding of privacy and security of the platform  The patient has agreed to participate and understands she can discontinue the visit at any time  Patient is aware this is a billable service  Subjective    Magy Garcia is a 49 y o  female   HPI   Patient started with URI  As discussed above    Advised the patient to work from home tele symptoms are getting better  Past Medical History:   Diagnosis Date    Abnormal Pap smear of cervix     Acute upper respiratory infection     Allergic rhinitis     Amenorrhea     Obesity     Perimenopausal symptoms     PONV (postoperative nausea and vomiting)        Past Surgical History:   Procedure Laterality Date    COLONOSCOPY N/A 5/22/2018    Procedure: COLONOSCOPY;  Surgeon: Aneesh Penaloza MD;  Location: [facility]; Service: Gastroenterology    MOUTH SURGERY      TONSILLECTOMY      TOOTH EXTRACTION         Current Outpatient Medications   Medication Sig Dispense Refill    benzonatate (TESSALON) 200 MG capsule Take 1 capsule (200 mg total) by mouth 3 (three) times a day as needed for cough 20 capsule 0    cholecalciferol (VITAMIN D3) 1,000 units tablet Take 1,000 Units by mouth daily      estradiol (CLIMARA) 0 025 mg/24 hr Apply one patch weekly as directed 12 patch 3    estradiol (ESTRACE) 0 1 mg/g vaginal cream Insert 1 g into the vagina 2 (two) times a week 42 5 g 1    Insulin Pen Needle 32G X 4 MM MISC Use once daily 100 each 1    levothyroxine 75 mcg tablet TAKE 1 TABLET BY MOUTH EVERY DAY 90 tablet 0    liraglutide (SAXENDA) injection Inject 0 5 mL (3 mg total) under the skin daily 5 pen 3    Misc Natural Products (FIBER 7 PO) Take by mouth      multivitamin (THERAGRAN) TABS Take 1 tablet by mouth daily      Probiotic Product (PROBIOTIC-10 PO) Take by mouth      progesterone (PROMETRIUM) 100 MG capsule One daily at bedtime 90 capsule 3    Progesterone 100 MG CAPS Take 1 capsule by mouth daily at bedtime       No current facility-administered medications for this visit  No Known Allergies    Review of Systems    There were no vitals filed for this visit  I spent 10 minutes with patient today in which greater than 50% of the time was spent in counseling/coordination of care regarding Plan of care    [address] verbally agrees to participate in [employer]   Pt is aware that [employer] could be limited without vital signs or the ability to perform a full hands-on physical Poornima Mooring understands she or the provider may request at any time to terminate the video visit and request the patient to seek care or treatment in person

## 2021-08-23 ENCOUNTER — TELEPHONE (OUTPATIENT)
Dept: FAMILY MEDICINE CLINIC | Facility: CLINIC | Age: 56
End: 2021-08-23

## 2021-08-23 NOTE — TELEPHONE ENCOUNTER
----- Message from John Paul Pickard, [address] [place] sent at 8/18/2021  8:22 AM EDT -----  Regarding: FW: Test Results Question  Contact: [phone]  Were you able to f/u on the last message I sent you about getting patents CT results?  ----- Message -----  From: Wanda Moise  Sent: 8/18/2021   7:55 AM EDT  To: [facility]  Subject: Test Results Question                            Hello I'm just checking in on the chest CT scan results? I'm still having pain under my left boob  It's especially bad whenever I lay down and is really making working out difficult  I don't know if it's a lung thing or a muscle thing or what, but it hurts

## 2021-09-28 RX ORDER — LIRAGLUTIDE 6 MG/ML
INJECTION, SOLUTION SUBCUTANEOUS
Qty: 15 ML | Refills: 3 | Status: SHIPPED | OUTPATIENT
Start: 2021-09-28 | End: 2022-02-22

## 2021-10-01 DIAGNOSIS — R63.5 ABNORMAL WEIGHT GAIN: ICD-10-CM

## 2021-10-01 DIAGNOSIS — R73.01 IMPAIRED FASTING GLUCOSE: ICD-10-CM

## 2021-10-01 DIAGNOSIS — E03.9 HYPOTHYROIDISM, UNSPECIFIED TYPE: ICD-10-CM

## 2021-10-01 RX ORDER — PEN NEEDLE, DIABETIC 32GX 5/32"
NEEDLE, DISPOSABLE MISCELLANEOUS
Qty: 100 EACH | Refills: 1 | Status: SHIPPED | OUTPATIENT
Start: 2021-10-01

## 2021-10-01 RX ORDER — LEVOTHYROXINE SODIUM 0.07 MG/1
TABLET ORAL
Qty: 90 TABLET | Refills: 0 | Status: SHIPPED | OUTPATIENT
Start: 2021-10-01 | End: 2021-10-15

## 2021-10-15 DIAGNOSIS — E03.9 HYPOTHYROIDISM, UNSPECIFIED TYPE: ICD-10-CM

## 2021-10-15 RX ORDER — LEVOTHYROXINE SODIUM 0.07 MG/1
TABLET ORAL
Qty: 90 TABLET | Refills: 0 | Status: SHIPPED | OUTPATIENT
Start: 2021-10-15 | End: 2022-06-13

## 2022-01-06 ENCOUNTER — ANNUAL EXAM (OUTPATIENT)
Dept: OBGYN CLINIC | Facility: CLINIC | Age: 57
End: 2022-01-06
Payer: COMMERCIAL

## 2022-01-06 VITALS
BODY MASS INDEX: 29.8 KG/M2 | WEIGHT: 220 LBS | HEIGHT: 72 IN | DIASTOLIC BLOOD PRESSURE: 72 MMHG | SYSTOLIC BLOOD PRESSURE: 118 MMHG

## 2022-01-06 DIAGNOSIS — Z12.4 CERVICAL CANCER SCREENING: Primary | ICD-10-CM

## 2022-01-06 DIAGNOSIS — N95.1 HOT FLASHES DUE TO MENOPAUSE: ICD-10-CM

## 2022-01-06 DIAGNOSIS — Z01.419 ENCOUNTER FOR ANNUAL ROUTINE GYNECOLOGICAL EXAMINATION: ICD-10-CM

## 2022-01-06 DIAGNOSIS — Z12.31 ENCOUNTER FOR SCREENING MAMMOGRAM FOR BREAST CANCER: ICD-10-CM

## 2022-01-06 DIAGNOSIS — Z11.51 SCREENING FOR HPV (HUMAN PAPILLOMAVIRUS): ICD-10-CM

## 2022-01-06 PROCEDURE — 99396 PREV VISIT EST AGE 40-64: CPT | Performed by: OBSTETRICS & GYNECOLOGY

## 2022-01-06 PROCEDURE — G0476 HPV COMBO ASSAY CA SCREEN: HCPCS | Performed by: OBSTETRICS & GYNECOLOGY

## 2022-01-06 PROCEDURE — G0145 SCR C/V CYTO,THINLAYER,RESCR: HCPCS | Performed by: OBSTETRICS & GYNECOLOGY

## 2022-01-06 PROCEDURE — 1036F TOBACCO NON-USER: CPT | Performed by: OBSTETRICS & GYNECOLOGY

## 2022-01-06 PROCEDURE — 3008F BODY MASS INDEX DOCD: CPT | Performed by: OBSTETRICS & GYNECOLOGY

## 2022-01-06 RX ORDER — ESTRADIOL 0.05 MG/D
1 PATCH TRANSDERMAL WEEKLY
Qty: 12 PATCH | Refills: 3 | Status: SHIPPED | OUTPATIENT
Start: 2022-01-06

## 2022-01-06 RX ORDER — PROGESTERONE 100 MG/1
1 CAPSULE ORAL
Qty: 90 CAPSULE | Refills: 3 | Status: SHIPPED | OUTPATIENT
Start: 2022-01-06 | End: 2022-01-13 | Stop reason: SDUPTHER

## 2022-01-06 NOTE — PROGRESS NOTES
Assessment/Plan:    Pap and HPV done today    mammogram reviewed with her including breast density  RX given and she will schedule     Discussed self breast exams    Vasomotor symptoms/night sweats-she will continue with her Climara and Prometrium  Will increase the Climara from 0 025-0 05  This should help with the night sweats and improve her sleep  She will call if she has any side effects from the increased dose  Irregular spotting-she is technically still karl menopausal   Since the bleeding is more irregular although minimal, would recommend sonohysterogram   She is agreeable and will schedule this  colon cancer screening - colonoscopy done at age 48, recall in 5 years    discussed preventive care, regular exercise and a healthy diet      No problem-specific Assessment & Plan notes found for this encounter  Diagnoses and all orders for this visit:    Cervical cancer screening  -     Liquid-based pap, screening    Encounter for annual routine gynecological examination  -     Liquid-based pap, screening    Encounter for screening mammogram for breast cancer  -     Mammo screening bilateral w 3d & cad; Future    Screening for HPV (human papillomavirus)  -     Liquid-based pap, screening    Hot flashes due to menopause  -     Progesterone 100 MG CAPS; Take 1 capsule by mouth daily at bedtime  -     estradiol (Climara) 0 05 mg/24 hr; Place 1 patch on the skin once a week          Subjective:      Patient ID: Sobia Thayer is a 49 y o  female  Patient here for yearly  She is on HRT for vasomotor symptoms  She feels that she is not sleeping well because she is hot at night  This is very disruptive to her sleep  Last year, she had an endometrial biopsy because she was having some light cyclical bleeding  This was normal   She had never gone a whole year without a menstrual cycle    She has a day or 2 of light bleeding almost every month but then she also have some very light spotting in between  She does not really keep track of it  She has not been consistently using the vaginal estrogen  She does use lubricant  Normal Pap, negative HPV in January 2018  Normal 3D mammogram in October 2020 with scattered fibroglandular densities and average risk  The following portions of the patient's history were reviewed and updated as appropriate: allergies, current medications, past family history, past medical history, past social history, past surgical history and problem list     Review of Systems   Constitutional: Negative  Gastrointestinal: Negative  Endocrine: Positive for heat intolerance  Genitourinary: Negative  Objective:      /72 (BP Location: Left arm, Patient Position: Sitting, Cuff Size: Standard)   Ht 6' (1 829 m)   Wt 99 8 kg (220 lb)   BMI 29 84 kg/m²          Physical Exam  Vitals reviewed  Constitutional:       Appearance: She is well-developed  Neck:      Thyroid: No thyromegaly  Trachea: No tracheal deviation  Cardiovascular:      Rate and Rhythm: Normal rate and regular rhythm  Pulmonary:      Effort: Pulmonary effort is normal       Breath sounds: Normal breath sounds  Chest:   Breasts: Breasts are symmetrical       Right: No inverted nipple, mass, nipple discharge, skin change or tenderness  Left: No inverted nipple, mass, nipple discharge, skin change or tenderness  Abdominal:      General: There is no distension  Palpations: Abdomen is soft  There is no mass  Tenderness: There is no abdominal tenderness  Genitourinary:     Labia:         Right: No rash, tenderness, lesion or injury  Left: No rash, tenderness, lesion or injury  Vagina: Normal       Cervix: No cervical motion tenderness, discharge or friability  Adnexa:         Right: No mass, tenderness or fullness  Left: No mass, tenderness or fullness          Comments: Declines rectal exam

## 2022-01-11 LAB
HPV HR 12 DNA CVX QL NAA+PROBE: NEGATIVE
HPV16 DNA CVX QL NAA+PROBE: NEGATIVE
HPV18 DNA CVX QL NAA+PROBE: NEGATIVE

## 2022-01-17 LAB
LAB AP GYN PRIMARY INTERPRETATION: NORMAL
Lab: NORMAL

## 2022-02-04 ENCOUNTER — APPOINTMENT (OUTPATIENT)
Dept: LAB | Facility: CLINIC | Age: 57
End: 2022-02-04
Payer: COMMERCIAL

## 2022-02-04 DIAGNOSIS — N95.9 MENOPAUSAL PROBLEM: ICD-10-CM

## 2022-02-04 DIAGNOSIS — I51.9 MYXEDEMA HEART DISEASE: ICD-10-CM

## 2022-02-04 DIAGNOSIS — E03.9 MYXEDEMA HEART DISEASE: ICD-10-CM

## 2022-02-04 LAB
25(OH)D3 SERPL-MCNC: 18.9 NG/ML (ref 30–100)
ALBUMIN SERPL BCP-MCNC: 3.7 G/DL (ref 3.5–5)
ALP SERPL-CCNC: 46 U/L (ref 46–116)
ALT SERPL W P-5'-P-CCNC: 23 U/L (ref 12–78)
ANION GAP SERPL CALCULATED.3IONS-SCNC: 5 MMOL/L (ref 4–13)
AST SERPL W P-5'-P-CCNC: 14 U/L (ref 5–45)
BASOPHILS # BLD AUTO: 0.03 THOUSANDS/ΜL (ref 0–0.1)
BASOPHILS NFR BLD AUTO: 1 % (ref 0–1)
BILIRUB SERPL-MCNC: 1.06 MG/DL (ref 0.2–1)
BUN SERPL-MCNC: 14 MG/DL (ref 5–25)
CALCIUM SERPL-MCNC: 9.4 MG/DL (ref 8.3–10.1)
CHLORIDE SERPL-SCNC: 110 MMOL/L (ref 100–108)
CHOLEST SERPL-MCNC: 185 MG/DL
CO2 SERPL-SCNC: 24 MMOL/L (ref 21–32)
CREAT SERPL-MCNC: 0.92 MG/DL (ref 0.6–1.3)
EOSINOPHIL # BLD AUTO: 0.08 THOUSAND/ΜL (ref 0–0.61)
EOSINOPHIL NFR BLD AUTO: 1 % (ref 0–6)
ERYTHROCYTE [DISTWIDTH] IN BLOOD BY AUTOMATED COUNT: 12.3 % (ref 11.6–15.1)
ERYTHROCYTE [SEDIMENTATION RATE] IN BLOOD: 24 MM/HOUR (ref 0–29)
EST. AVERAGE GLUCOSE BLD GHB EST-MCNC: 91 MG/DL
ESTRADIOL SERPL-MCNC: 54 PG/ML
FERRITIN SERPL-MCNC: 63 NG/ML (ref 8–388)
GFR SERPL CREATININE-BSD FRML MDRD: 70 ML/MIN/1.73SQ M
GLUCOSE P FAST SERPL-MCNC: 83 MG/DL (ref 65–99)
HBA1C MFR BLD: 4.8 %
HCT VFR BLD AUTO: 43.3 % (ref 34.8–46.1)
HDLC SERPL-MCNC: 47 MG/DL
HGB BLD-MCNC: 14.4 G/DL (ref 11.5–15.4)
IMM GRANULOCYTES # BLD AUTO: 0 THOUSAND/UL (ref 0–0.2)
IMM GRANULOCYTES NFR BLD AUTO: 0 % (ref 0–2)
INSULIN SERPL-ACNC: 10.1 MU/L (ref 3–25)
LDLC SERPL CALC-MCNC: 118 MG/DL (ref 0–100)
LYMPHOCYTES # BLD AUTO: 0.94 THOUSANDS/ΜL (ref 0.6–4.47)
LYMPHOCYTES NFR BLD AUTO: 17 % (ref 14–44)
MCH RBC QN AUTO: 31.6 PG (ref 26.8–34.3)
MCHC RBC AUTO-ENTMCNC: 33.3 G/DL (ref 31.4–37.4)
MCV RBC AUTO: 95 FL (ref 82–98)
MONOCYTES # BLD AUTO: 0.35 THOUSAND/ΜL (ref 0.17–1.22)
MONOCYTES NFR BLD AUTO: 6 % (ref 4–12)
NEUTROPHILS # BLD AUTO: 4.18 THOUSANDS/ΜL (ref 1.85–7.62)
NEUTS SEG NFR BLD AUTO: 75 % (ref 43–75)
NONHDLC SERPL-MCNC: 138 MG/DL
NRBC BLD AUTO-RTO: 0 /100 WBCS
PLATELET # BLD AUTO: 270 THOUSANDS/UL (ref 149–390)
PMV BLD AUTO: 9.3 FL (ref 8.9–12.7)
POTASSIUM SERPL-SCNC: 3.9 MMOL/L (ref 3.5–5.3)
PROGEST SERPL-MCNC: 8.5 NG/ML
PROT SERPL-MCNC: 7.2 G/DL (ref 6.4–8.2)
RBC # BLD AUTO: 4.56 MILLION/UL (ref 3.81–5.12)
SODIUM SERPL-SCNC: 139 MMOL/L (ref 136–145)
T3FREE SERPL-MCNC: 2.64 PG/ML (ref 2.3–4.2)
T4 FREE SERPL-MCNC: 1.23 NG/DL (ref 0.76–1.46)
TRIGL SERPL-MCNC: 98 MG/DL
TSH SERPL DL<=0.05 MIU/L-ACNC: 2.93 UIU/ML (ref 0.36–3.74)
VIT B12 SERPL-MCNC: 392 PG/ML (ref 100–900)
WBC # BLD AUTO: 5.58 THOUSAND/UL (ref 4.31–10.16)

## 2022-02-04 PROCEDURE — 85652 RBC SED RATE AUTOMATED: CPT

## 2022-02-04 PROCEDURE — 80053 COMPREHEN METABOLIC PANEL: CPT

## 2022-02-04 PROCEDURE — 82679 ASSAY OF ESTRONE: CPT

## 2022-02-04 PROCEDURE — 84439 ASSAY OF FREE THYROXINE: CPT

## 2022-02-04 PROCEDURE — 84144 ASSAY OF PROGESTERONE: CPT

## 2022-02-04 PROCEDURE — 84443 ASSAY THYROID STIM HORMONE: CPT

## 2022-02-04 PROCEDURE — 84481 FREE ASSAY (FT-3): CPT

## 2022-02-04 PROCEDURE — 86038 ANTINUCLEAR ANTIBODIES: CPT

## 2022-02-04 PROCEDURE — 86800 THYROGLOBULIN ANTIBODY: CPT

## 2022-02-04 PROCEDURE — 83036 HEMOGLOBIN GLYCOSYLATED A1C: CPT

## 2022-02-04 PROCEDURE — 86376 MICROSOMAL ANTIBODY EACH: CPT

## 2022-02-04 PROCEDURE — 82728 ASSAY OF FERRITIN: CPT

## 2022-02-04 PROCEDURE — 83525 ASSAY OF INSULIN: CPT

## 2022-02-04 PROCEDURE — 36415 COLL VENOUS BLD VENIPUNCTURE: CPT

## 2022-02-04 PROCEDURE — 82306 VITAMIN D 25 HYDROXY: CPT

## 2022-02-04 PROCEDURE — 82670 ASSAY OF TOTAL ESTRADIOL: CPT

## 2022-02-04 PROCEDURE — 85025 COMPLETE CBC W/AUTO DIFF WBC: CPT

## 2022-02-04 PROCEDURE — 84482 T3 REVERSE: CPT

## 2022-02-04 PROCEDURE — 84432 ASSAY OF THYROGLOBULIN: CPT

## 2022-02-04 PROCEDURE — 80061 LIPID PANEL: CPT

## 2022-02-04 PROCEDURE — 82607 VITAMIN B-12: CPT

## 2022-02-05 LAB — THYROPEROXIDASE AB SERPL-ACNC: <8 IU/ML (ref 0–34)

## 2022-02-07 LAB
ESTRONE SERPL-MCNC: 127 PG/ML
RYE IGE QN: NEGATIVE

## 2022-02-08 LAB — T3REVERSE SERPL-MCNC: 20 NG/DL (ref 9.2–24.1)

## 2022-02-15 LAB
THYROGLOB AB SERPL-ACNC: 1.5 IU/ML (ref 0–0.9)
THYROGLOB SERPL-MCNC: 5.4 NG/ML

## 2022-02-22 RX ORDER — LIRAGLUTIDE 6 MG/ML
INJECTION, SOLUTION SUBCUTANEOUS
Qty: 3 ML | Refills: 3 | Status: SHIPPED | OUTPATIENT
Start: 2022-02-22

## 2022-03-03 ENCOUNTER — TELEPHONE (OUTPATIENT)
Dept: FAMILY MEDICINE CLINIC | Facility: CLINIC | Age: 57
End: 2022-03-03

## 2022-03-03 NOTE — TELEPHONE ENCOUNTER
Marina form [employer] called for a prior authorization on the patient's Saxenda 18 mg injection  Please call her back and use reference # [id number]  Thank You!

## 2022-03-09 ENCOUNTER — TELEPHONE (OUTPATIENT)
Dept: FAMILY MEDICINE CLINIC | Facility: CLINIC | Age: 57
End: 2022-03-09

## 2022-03-14 ENCOUNTER — TELEMEDICINE (OUTPATIENT)
Dept: FAMILY MEDICINE CLINIC | Facility: CLINIC | Age: 57
End: 2022-03-14
Payer: COMMERCIAL

## 2022-03-14 ENCOUNTER — TELEPHONE (OUTPATIENT)
Dept: FAMILY MEDICINE CLINIC | Facility: CLINIC | Age: 57
End: 2022-03-14

## 2022-03-14 DIAGNOSIS — J20.9 ACUTE BRONCHITIS, UNSPECIFIED ORGANISM: Primary | ICD-10-CM

## 2022-03-14 PROCEDURE — 1036F TOBACCO NON-USER: CPT | Performed by: INTERNAL MEDICINE

## 2022-03-14 PROCEDURE — 99213 OFFICE O/P EST LOW 20 MIN: CPT | Performed by: INTERNAL MEDICINE

## 2022-03-14 RX ORDER — AZITHROMYCIN 250 MG/1
TABLET, FILM COATED ORAL
Qty: 6 TABLET | Refills: 0 | Status: SHIPPED | OUTPATIENT
Start: 2022-03-14 | End: 2022-03-18

## 2022-03-14 RX ORDER — BENZONATATE 200 MG/1
200 CAPSULE ORAL 3 TIMES DAILY PRN
Qty: 20 CAPSULE | Refills: 0 | Status: SHIPPED | OUTPATIENT
Start: 2022-03-14 | End: 2022-06-13

## 2022-03-14 RX ORDER — LEVOTHYROXINE SODIUM 0.1 MG/1
100 TABLET ORAL EVERY MORNING
COMMUNITY
Start: 2022-02-14

## 2022-03-14 RX ORDER — ALBUTEROL SULFATE 90 UG/1
2 AEROSOL, METERED RESPIRATORY (INHALATION) EVERY 6 HOURS PRN
Qty: 18 G | Refills: 5 | Status: SHIPPED | OUTPATIENT
Start: 2022-03-14

## 2022-03-14 RX ORDER — AMOXICILLIN 500 MG/1
CAPSULE ORAL
COMMUNITY
Start: 2022-01-17 | End: 2022-06-13

## 2022-03-14 RX ORDER — FLUTICASONE PROPIONATE 220 UG/1
2 AEROSOL, METERED RESPIRATORY (INHALATION) 2 TIMES DAILY
Qty: 12 G | Refills: 0 | Status: SHIPPED | OUTPATIENT
Start: 2022-03-14 | End: 2022-04-05

## 2022-03-14 NOTE — TELEPHONE ENCOUNTER
----- Message from Eli Horner MD sent at 3/14/2022 10:57 AM EDT -----  Could you please give her a note for 3 days off thank you

## 2022-03-14 NOTE — PROGRESS NOTES
Virtual Regular Visit    Verification of patient location:    Patient is located in the following state in which I hold an active license [place]      Assessment/Plan:    Problem List Items Addressed This Visit     None      Visit Diagnoses     Acute bronchitis, unspecified organism    -  Primary    Relevant Medications    fluticasone (Flovent HFA) 220 mcg/act inhaler    albuterol (Ventolin HFA) 90 mcg/act inhaler    azithromycin (ZITHROMAX) 250 mg tablet    benzonatate (TESSALON) 200 MG capsule      Patient will keep me posted about her progress  Reason for visit is   Chief Complaint   Patient presents with    Laryngitis     Negative Covid test    Cough    Virtual Regular Visit        Encounter provider Bar Victor MD    Provider located at [address] [place] [address] , [address]  [phone]      Recent Visits  Date Type Provider Dept   03/09/22 Telephone Bar Victor MD [address] recent visits within past 7 days and meeting all other requirements  Today's Visits  Date Type Provider Dept   03/14/22 Telemedicine MD [facility] today's visits and meeting all other requirements  Future Appointments  No visits were found meeting these conditions  Showing future appointments within next 150 days and meeting all other requirements       The patient was identified by name and date of birth  Dhruv Fernandez was informed that this is a telemedicine visit and that the visit is being conducted through [address] and patient was informed this is a secure, HIPAA-complaint platform  She agrees to proceed     My office door was closed  No one else was in the room  She acknowledged consent and understanding of privacy and security of the video platform  The patient has agreed to participate and understands they can discontinue the visit at any time      Patient is aware this is a billable service  Subjective  Noel Archibald is a 49 y o  female    Patient is being evaluated tele health for acute visit complaining of cough the started above 3 days ago with a tickle in the throat  Denies any fevers and chills  She does mention chest tightness and occasional wheezing  She had checked her COVID test twice with the last 1 this morning which has been negative  Patient is triple vaccinated  Patient mentions recently having retinal tear that was faxed about 2 weeks ago  Will start with Amanda Barbosa she will update me about her progress  Cough does not get better we would start on cough suppressant with codeine  HPI     Past Medical History:   Diagnosis Date    Abnormal Pap smear of cervix     Acute upper respiratory infection     Allergic rhinitis     Amenorrhea     Obesity     Perimenopausal symptoms     PONV (postoperative nausea and vomiting)        Past Surgical History:   Procedure Laterality Date    COLONOSCOPY N/A 5/22/2018    Procedure: COLONOSCOPY;  Surgeon: Ashok Calvo MD;  Location: [facility];   Service: Gastroenterology    MOUTH SURGERY      TONSILLECTOMY      TOOTH EXTRACTION         Current Outpatient Medications   Medication Sig Dispense Refill    BD Pen Needle Nikki 2nd Gen 32G X 4 MM MISC USE ONCE DAILY AS DIRECTED 100 each 1    cholecalciferol (VITAMIN D3) 1,000 units tablet Take 1,000 Units by mouth daily      estradiol (ESTRACE) 0 1 mg/g vaginal cream Insert 1 g into the vagina 2 (two) times a week 42 5 g 1    levothyroxine 100 mcg tablet Take 100 mcg by mouth every morning Take on an empty stomach      levothyroxine 75 mcg tablet TAKE 1 TABLET BY MOUTH EVERY DAY (Patient taking differently: 100 mcg  ) 90 tablet 0    Misc Natural Products (FIBER 7 PO) Take by mouth      multivitamin (THERAGRAN) TABS Take 1 tablet by mouth daily      Probiotic Product (PROBIOTIC-10 PO) Take by mouth      progesterone (PROMETRIUM) 100 MG capsule One daily at bedtime (Patient taking differently: 150 mg One daily at bedtime ) 90 capsule 3    Saxenda injection INJECT 0 5 ML (3 MG TOTAL) UNDER THE SKIN DAILY 3 mL 3    albuterol (Ventolin HFA) 90 mcg/act inhaler Inhale 2 puffs every 6 (six) hours as needed for wheezing 18 g 5    amoxicillin (AMOXIL) 500 mg capsule TAKE 1 CAPSULE BY MOUTH THREE TIMES A DAY UNTIL COMPLETE (Patient not taking: Reported on 3/14/2022)      azithromycin (ZITHROMAX) 250 mg tablet 2 pill first day and one pill 2-5 th day 6 tablet 0    benzonatate (TESSALON) 200 MG capsule Take 1 capsule (200 mg total) by mouth 3 (three) times a day as needed for cough (Patient not taking: Reported on 3/14/2022 ) 20 capsule 0    benzonatate (TESSALON) 200 MG capsule Take 1 capsule (200 mg total) by mouth 3 (three) times a day as needed for cough 20 capsule 0    estradiol (Climara) 0 05 mg/24 hr Place 1 patch on the skin once a week (Patient not taking: Reported on 3/14/2022 ) 12 patch 3    fluticasone (Flovent HFA) 220 mcg/act inhaler Inhale 2 puffs 2 (two) times a day Rinse mouth after use  12 g 0    Progesterone 100 MG CAPS Take 1 capsule by mouth daily at bedtime 90 capsule 3     No current facility-administered medications for this visit  No Known Allergies    Review of Systems    Video Exam    There were no vitals filed for this visit  Physical Exam  Pulmonary:      Comments: Deep moist cough however does not seem to be in respiratory distress  Neurological:      Mental Status: She is alert  I spent 15 minutes with patient today in which greater than 50% of the time was spent in counseling/coordination of care regarding Plan of care    [address] verbally agrees to participate in [employer]   Pt is aware that [employer] could be limited without vital signs or the ability to perform a full hands-on physical exam  Krystal Guerrero understands she or the provider may request at any time to terminate the video visit and request the patient to seek care or treatment in person

## 2022-04-05 DIAGNOSIS — J20.9 ACUTE BRONCHITIS, UNSPECIFIED ORGANISM: ICD-10-CM

## 2022-04-05 RX ORDER — FLUTICASONE PROPIONATE 220 UG/1
AEROSOL, METERED RESPIRATORY (INHALATION)
Qty: 12 G | Refills: 3 | Status: SHIPPED | OUTPATIENT
Start: 2022-04-05

## 2022-05-17 ENCOUNTER — APPOINTMENT (OUTPATIENT)
Dept: LAB | Facility: CLINIC | Age: 57
End: 2022-05-17
Payer: COMMERCIAL

## 2022-05-17 DIAGNOSIS — E03.9 HYPOTHYROIDISM, UNSPECIFIED TYPE: ICD-10-CM

## 2022-05-17 DIAGNOSIS — N95.9 MENOPAUSAL PROBLEM: ICD-10-CM

## 2022-05-17 LAB
ESTRADIOL SERPL-MCNC: <11 PG/ML
FSH SERPL-ACNC: 61.6 MIU/ML
PROGEST SERPL-MCNC: 4 NG/ML
T3FREE SERPL-MCNC: 2.47 PG/ML (ref 2.3–4.2)
T4 FREE SERPL-MCNC: 1.15 NG/DL (ref 0.76–1.46)
TSH SERPL DL<=0.05 MIU/L-ACNC: 1.07 UIU/ML (ref 0.45–4.5)

## 2022-05-17 PROCEDURE — 83001 ASSAY OF GONADOTROPIN (FSH): CPT

## 2022-05-17 PROCEDURE — 84443 ASSAY THYROID STIM HORMONE: CPT

## 2022-05-17 PROCEDURE — 82679 ASSAY OF ESTRONE: CPT

## 2022-05-17 PROCEDURE — 82670 ASSAY OF TOTAL ESTRADIOL: CPT

## 2022-05-17 PROCEDURE — 82627 DEHYDROEPIANDROSTERONE: CPT

## 2022-05-17 PROCEDURE — 36415 COLL VENOUS BLD VENIPUNCTURE: CPT

## 2022-05-17 PROCEDURE — 84439 ASSAY OF FREE THYROXINE: CPT

## 2022-05-17 PROCEDURE — 84144 ASSAY OF PROGESTERONE: CPT

## 2022-05-17 PROCEDURE — 84481 FREE ASSAY (FT-3): CPT

## 2022-05-18 LAB — DHEA-S SERPL-MCNC: 76.9 UG/DL (ref 41.2–243.7)

## 2022-05-20 LAB — ESTRONE SERPL-MCNC: 10 PG/ML

## 2022-06-13 ENCOUNTER — HOSPITAL ENCOUNTER (OUTPATIENT)
Dept: RADIOLOGY | Facility: HOSPITAL | Age: 57
Discharge: HOME | End: 2022-06-13
Payer: COMMERCIAL

## 2022-06-13 ENCOUNTER — AMB VIDEO VISIT (OUTPATIENT)
Dept: OTHER | Facility: HOSPITAL | Age: 57
End: 2022-06-13

## 2022-06-13 VITALS — RESPIRATION RATE: 16 BRPM

## 2022-06-13 DIAGNOSIS — S89.91XA RIGHT KNEE INJURY, INITIAL ENCOUNTER: ICD-10-CM

## 2022-06-13 DIAGNOSIS — S89.91XA RIGHT KNEE INJURY, INITIAL ENCOUNTER: Primary | ICD-10-CM

## 2022-06-13 PROCEDURE — ECARE PR SL URGENT CARE VIRTUAL VISIT: Performed by: NURSE PRACTITIONER

## 2022-06-13 PROCEDURE — 73564 X-RAY EXAM KNEE 4 OR MORE: CPT

## 2022-06-13 NOTE — CARE ANYWHERE EVISITS
Visit Summary for Tj LEMA - Gender: Female - Date of Birth: [id number]  Date: [id number] - Duration: 14 minutes  Patient: Tj LEMA  Provider: Martinez VALENCIA    Patient Contact Information  Address  [address] [facility]; [address]  [phone]    Visit Topics    Triage Questions   What is your current physical address in the event of a medical emergency? Answer []  Are you allergic to any medications? Answer []  Are you now or could you be pregnant? Answer []  Do you have any immune system compromise or chronic lung   disease? Answer []  Do you have any vulnerable family members in the home (infant, pregnant, cancer, elderly)? Answer []     Conversation Transcripts  [0A][0A] [Notification] You are connected with May Thibodeaux, Urgent Care Specialist [0A][Notification] Ever Chaparro CELSA [place] is located in [place]  [0A][Notification] Tj [place] has shared health history  Dc Colón  [0A]    Diagnosis  Unspecified injury of right lower leg, initial encounter    Procedures  Value: [id number] Code: CPT-4 UNLISTED E&M SERVICE    Medications Prescribed    No prescriptions ordered    Electronically signed by: May Shah(NPI [id number])

## 2022-06-13 NOTE — PROGRESS NOTES
Video Visit - [place] 56 y o  female MRN: [id number]    REQUIRED DOCUMENTATION:         1  This service was provided via [facility]  2  Provider located at [address] [place] [id number]  3  [facility] provider: ERIK Solis  4  Identify all parties in room with patient during [facility] visit:  patient   5  After connecting through [employer], patient was identified by name and date of birth  Patient was then informed that this was a Telemedicine visit and that the exam was being conducted confidentially over secure lines  My office door was closed  No one else was in the room  Patient acknowledged consent and understanding of privacy and security of the Telemedicine visit  I informed the patient that I have reviewed their record in Epic and presented the opportunity for them to ask any questions regarding the visit today  The patient agreed to participate  56year old female here today for video visit  She states at end of March had a fall  She had bruise on front of her knee  Unclear if there was any twisting mechanism during fall  Knee was sore at that time  She continues to have some discomfort in her knee since then  She states she is having stiffness in her knee  Some discomfort with bending  She denies any numbness or tingling  She does have some swelling in front of knee but stiffness is primarily in the back of the knee  She has been using ice and ibuprofen  She has been using a brace  She is able to bear weight  She is able to exercise  No pain with squats  She feels some discomfort when she goes up stairs  She does note that her left leg is slight shorter than right due to injury as child, no other leg or knee injuries     Vitals:    06/13/22 1109   Resp: 16       Review of Systems   Constitutional: Negative for activity change, chills and fatigue  Respiratory: Negative  Cardiovascular: Negative      Musculoskeletal: Right knee pain   Neurological: Negative  Psychiatric/Behavioral: Negative  Physical Exam  Musculoskeletal:      Comments: Right knee: mild swelling of the anterior knee noted over video visit  Full ROM, no ttp with self palpation during video visit  Skin:     Comments: No rash on head or neck  Neurological:      Mental Status: She is oriented to person, place, and time  Psychiatric:         Mood and Affect: Mood normal          Behavior: Behavior normal          Thought Content: Thought content normal          Judgment: Judgment normal        Diagnoses and all orders for this visit:    Right knee injury, initial encounter  -     XR knee 4+ vw right injury; Future  -     Ambulatory Referral to Orthopedic Surgery; Future  -     Diclofenac Sodium (VOLTAREN) 1 %; Apply 4 g topically 3 (three) times a day as needed (pain) for up to 14 days      Patient Instructions     Will order xray  Rest    Ice as needed  Continue to use knee brace  Voltaren gel sent to pharmacy you can use this as needed  Referral placed for ortho  Knee Sprain   WHAT YOU NEED TO KNOW:   A knee sprain is a stretched or torn ligament in your knee  Ligaments support the knee and keep the joint and bones in the correct position  A knee sprain may involve one or more ligaments  DISCHARGE INSTRUCTIONS:   Return to the emergency department if:   · Any part of your leg feels cold, numb, or looks pale  Call your doctor if:   · You have new or increased swelling, bruising, or pain in your knee  · Your symptoms do not improve within 6 weeks, even with treatment  · You have questions or concerns about your condition or care  Medicines:   · NSAIDs , such as ibuprofen, help decrease swelling, pain, and fever  This medicine is available with or without a doctor's order  NSAIDs can cause stomach bleeding or kidney problems in certain people   If you take blood thinner medicine, always ask your healthcare provider if NSAIDs are safe for you  Always read the medicine label and follow directions  · Acetaminophen  decreases pain and fever  It is available without a doctor's order  Ask how much to take and how often to take it  Follow directions  Read the labels of all other medicines you are using to see if they also contain acetaminophen, or ask your doctor or pharmacist  Acetaminophen can cause liver damage if not taken correctly  Do not use more than 4 grams (4,000 milligrams) total of acetaminophen in one day  · Prescription pain medicine  may be given  Ask your healthcare provider how to take this medicine safely  Some prescription pain medicines contain acetaminophen  Do not take other medicines that contain acetaminophen without talking to your healthcare provider  Too much acetaminophen may cause liver damage  Prescription pain medicine may cause constipation  Ask your healthcare provider how to prevent or treat constipation  · Take your medicine as directed  Contact your healthcare provider if you think your medicine is not helping or if you have side effects  Tell him or her if you are allergic to any medicine  Keep a list of the medicines, vitamins, and herbs you take  Include the amounts, and when and why you take them  Bring the list or the pill bottles to follow-up visits  Carry your medicine list with you in case of an emergency  A support device  such as a splint or brace may be needed  These devices limit movement and protect the joint while it heals  You may be given crutches to use until you can stand on your injured leg without pain  Physical therapy  may be needed  A physical therapist teaches you exercises to help improve movement and strength, and to decrease pain  Manage a knee sprain:   · Rest  your knee and do not exercise  Do not walk on your injured leg if you are told to keep weight off your knee   Rest helps decrease swelling and allows the injury to heal  You can do gentle range of motion exercises as directed to prevent stiffness  · Apply ice  on your knee for 15 to 20 minutes every hour or as directed  Use an ice pack, or put crushed ice in a plastic bag  Cover the bag with a towel before you apply it  Ice helps prevent tissue damage and decreases swelling and pain  · Apply compression  to your knee as directed  You may need to wear an elastic bandage  This helps keep your injured knee from moving too much while it heals  It should be tight enough to give support but so tight that it causes your toes to feel numb or tingly  Take the bandage off and rewrap it at least 1 time each day  · Elevate your knee  above the level of your heart as often as you can  This will help decrease swelling and pain  Prop your leg on pillows or blankets to keep it elevated comfortably  Do not put pillows directly behind your knee  Prevent another knee sprain:  Exercise your legs to keep your muscles strong  Strong leg muscles help protect your knee and prevent strain  The following may also prevent a knee sprain:  · Slowly start your exercise or training program   Slowly increase the time, distance, and intensity of your exercise  Sudden increases in training may cause another knee sprain  · Wear protective braces and equipment as directed  Braces may prevent your knee from moving the wrong way and causing another sprain  Protective equipment may support your bones and ligaments to prevent injury  · Warm up and stretch before exercise  Warm up by walking or using an exercise bike before starting your regular exercise  Do gentle stretches after warming up  This helps to loosen your muscles and decrease stress on your knee  Cool down and stretch after you exercise  · Wear shoes that fit correctly and support your feet  Replace your running or exercise shoes before the padding or shock absorption is worn out  Ask your healthcare provider which exercise shoes are best for you   Ask if you should wear shoe inserts  Shoe inserts can help support your heels and arches or keep your foot lined up correctly in your shoes  Exercise on flat surfaces  Follow up with your doctor as directed:  Write down your questions so you remember to ask them during your visits  © Copyright [employer] 2022 Information is for End User's use only and may not be sold, redistributed or otherwise used for commercial purposes  All illustrations and images included in CareNotes® are the copyrighted property of [employer]  or [employer]   The above information is an  only  It is not intended as medical advice for individual conditions or treatments  Talk to your doctor, nurse or pharmacist before following any medical regimen to see if it is safe and effective for you  Follow up with PCP if not improved, if symptoms are worse, go to the ER

## 2022-06-13 NOTE — PATIENT INSTRUCTIONS
Will order xray  Rest    Ice as needed  Continue to use knee brace  Voltaren gel sent to pharmacy you can use this as needed  Referral placed for ortho  Knee Sprain   WHAT YOU NEED TO KNOW:   A knee sprain is a stretched or torn ligament in your knee  Ligaments support the knee and keep the joint and bones in the correct position  A knee sprain may involve one or more ligaments  DISCHARGE INSTRUCTIONS:   Return to the emergency department if:   Any part of your leg feels cold, numb, or looks pale  Call your doctor if:   You have new or increased swelling, bruising, or pain in your knee  Your symptoms do not improve within 6 weeks, even with treatment  You have questions or concerns about your condition or care  Medicines:   NSAIDs , such as ibuprofen, help decrease swelling, pain, and fever  This medicine is available with or without a doctor's order  NSAIDs can cause stomach bleeding or kidney problems in certain people  If you take blood thinner medicine, always ask your healthcare provider if NSAIDs are safe for you  Always read the medicine label and follow directions  Acetaminophen  decreases pain and fever  It is available without a doctor's order  Ask how much to take and how often to take it  Follow directions  Read the labels of all other medicines you are using to see if they also contain acetaminophen, or ask your doctor or pharmacist  Acetaminophen can cause liver damage if not taken correctly  Do not use more than 4 grams (4,000 milligrams) total of acetaminophen in one day  Prescription pain medicine  may be given  Ask your healthcare provider how to take this medicine safely  Some prescription pain medicines contain acetaminophen  Do not take other medicines that contain acetaminophen without talking to your healthcare provider  Too much acetaminophen may cause liver damage  Prescription pain medicine may cause constipation   Ask your healthcare provider how to prevent or treat constipation  Take your medicine as directed  Contact your healthcare provider if you think your medicine is not helping or if you have side effects  Tell him or her if you are allergic to any medicine  Keep a list of the medicines, vitamins, and herbs you take  Include the amounts, and when and why you take them  Bring the list or the pill bottles to follow-up visits  Carry your medicine list with you in case of an emergency  A support device  such as a splint or brace may be needed  These devices limit movement and protect the joint while it heals  You may be given crutches to use until you can stand on your injured leg without pain  Physical therapy  may be needed  A physical therapist teaches you exercises to help improve movement and strength, and to decrease pain  Manage a knee sprain:   Rest  your knee and do not exercise  Do not walk on your injured leg if you are told to keep weight off your knee  Rest helps decrease swelling and allows the injury to heal  You can do gentle range of motion exercises as directed to prevent stiffness  Apply ice  on your knee for 15 to 20 minutes every hour or as directed  Use an ice pack, or put crushed ice in a plastic bag  Cover the bag with a towel before you apply it  Ice helps prevent tissue damage and decreases swelling and pain  Apply compression  to your knee as directed  You may need to wear an elastic bandage  This helps keep your injured knee from moving too much while it heals  It should be tight enough to give support but so tight that it causes your toes to feel numb or tingly  Take the bandage off and rewrap it at least 1 time each day  Elevate your knee  above the level of your heart as often as you can  This will help decrease swelling and pain  Prop your leg on pillows or blankets to keep it elevated comfortably  Do not put pillows directly behind your knee         Prevent another knee sprain:  Exercise your legs to keep your muscles strong  Strong leg muscles help protect your knee and prevent strain  The following may also prevent a knee sprain:  Slowly start your exercise or training program   Slowly increase the time, distance, and intensity of your exercise  Sudden increases in training may cause another knee sprain  Wear protective braces and equipment as directed  Braces may prevent your knee from moving the wrong way and causing another sprain  Protective equipment may support your bones and ligaments to prevent injury  Warm up and stretch before exercise  Warm up by walking or using an exercise bike before starting your regular exercise  Do gentle stretches after warming up  This helps to loosen your muscles and decrease stress on your knee  Cool down and stretch after you exercise  Wear shoes that fit correctly and support your feet  Replace your running or exercise shoes before the padding or shock absorption is worn out  Ask your healthcare provider which exercise shoes are best for you  Ask if you should wear shoe inserts  Shoe inserts can help support your heels and arches or keep your foot lined up correctly in your shoes  Exercise on flat surfaces  Follow up with your doctor as directed:  Write down your questions so you remember to ask them during your visits  © Copyright [employer] 2022 Information is for End User's use only and may not be sold, redistributed or otherwise used for commercial purposes  All illustrations and images included in CareNotes® are the copyrighted property of [employer]  or Talya Hodge  The above information is an  only  It is not intended as medical advice for individual conditions or treatments  Talk to your doctor, nurse or pharmacist before following any medical regimen to see if it is safe and effective for you

## 2022-06-17 ENCOUNTER — TELEPHONE (OUTPATIENT)
Dept: OBGYN CLINIC | Facility: CLINIC | Age: 57
End: 2022-06-17

## 2022-06-17 ENCOUNTER — PROCEDURE VISIT (OUTPATIENT)
Dept: OBGYN CLINIC | Facility: CLINIC | Age: 57
End: 2022-06-17
Payer: COMMERCIAL

## 2022-06-17 ENCOUNTER — ULTRASOUND (OUTPATIENT)
Dept: OBGYN CLINIC | Facility: CLINIC | Age: 57
End: 2022-06-17
Payer: COMMERCIAL

## 2022-06-17 DIAGNOSIS — N92.3 INTERMENSTRUAL BLEEDING: Primary | ICD-10-CM

## 2022-06-17 DIAGNOSIS — R93.89 ENDOMETRIAL THICKENING ON ULTRASOUND: Primary | ICD-10-CM

## 2022-06-17 PROCEDURE — 99213 OFFICE O/P EST LOW 20 MIN: CPT | Performed by: OBSTETRICS & GYNECOLOGY

## 2022-06-17 PROCEDURE — 76830 TRANSVAGINAL US NON-OB: CPT | Performed by: OBSTETRICS & GYNECOLOGY

## 2022-06-17 NOTE — TELEPHONE ENCOUNTER
I spoke to Palma De La Torre at [facility] on 6/17/2022 at 12:57 pm no prior authorization is needed for CPT code  being performed on 7/5/2022        REF # [id number]

## 2022-06-17 NOTE — PROGRESS NOTES
Assessment/Plan:     Endometrial thickening on US - we discussed sampling the endometrium  She is very hesitant to have an EMB  She has had 1 in the past and was extremely uncomfortable  We discussed sonohysterogram verses D&C  She prefers to proceed straight to [facility]  as we can do more thorough sampling and it will be done under anesthesia  D&C, hysteroscopy reviewed with pt in detail  Risks reviewed including bleeding, infection, 1% risk of uterine perforation with rare risk of injury to surrounding structures and need for further surgery  Post op course reviewed in detail as well including activity restrictions  Pt's questions were answered  Consent reviewed and signed with her  Pt to schedule  There are no diagnoses linked to this encounter  Subjective:     Patient ID: Daryl Canavan is a 56 y o  female  Patient here to discuss ultrasound results  She was initially scheduled for a sonohysterogram and a possible biopsy  She had been having sporadic and irregular light spotting  She has been on HRT and this was started when she was karl menopausal   She had an endometrial biopsy for the same thing in October 2020 and this showed benign, weakly proliferative endometrium  She had never gone 1 whole year without a menstrual cycle and it was difficult to tell if she was having postmenopausal bleeding on HRT or if she was still karl menopausal     Recently, she saw Dr Benjamin Simms and her HRT was changed and she is now on by OS to cream, oral progesterone 200 mg  Review of Systems   Constitutional: Negative  Gastrointestinal: Negative  Genitourinary: Negative            Objective:     Physical Exam

## 2022-06-17 NOTE — PROGRESS NOTES
AMB US Pelvic Non OB    Date/Time: 6/17/2022 8:01 AM  Performed by: Frida Rodriguez  Authorized by: Patrick Ty DO   Universal Protocol:  Patient identity confirmed: verbally with patient      Procedure details:     Technique:  Transvaginal US, Non-OB    Position: lithotomy exam    Uterine findings:     Length (cm): 6 78    Height (cm):  3 75    Width (cm):  4 64    Endometrial stripe: identified      Endometrium thickness (mm):  9 9  Left ovary findings:     Left ovary:  Visualized    Length (cm): 3 23    Height (cm): 1 47    Width (cm): 2  Right ovary findings:     Right ovary:  Visualized    Length (cm): 2 34    Height (cm): 1 37    Width (cm): 1 45  Other findings:     Free pelvic fluid: not identified      Free peritoneal fluid: not identified    Post-Procedure Details:     Impression:  Anteverted uterus demonstrates a thickened endometrium for postmenopausal patient  Otherwise, uterus and bilateral ovaries appear within normal limits  No free fluid  Tolerance: Tolerated well, no immediate complications    Complications: no complications    Additional Procedure Comments:      Socure F8 E8C-RS transvaginal transducer Serial # [id number] was used to perform the examination today and subsequently followed with high level disinfection utilizing Trophon EPR procedure  Ultrasound performed at:     [address]  [address]  Phone:  [phone]  Fax:  [phone]

## 2022-06-27 ENCOUNTER — TELEPHONE (OUTPATIENT)
Dept: OBGYN CLINIC | Facility: CLINIC | Age: 57
End: 2022-06-27

## 2022-06-28 ENCOUNTER — OFFICE VISIT (OUTPATIENT)
Dept: OBGYN CLINIC | Facility: CLINIC | Age: 57
End: 2022-06-28
Payer: COMMERCIAL

## 2022-06-28 ENCOUNTER — TELEPHONE (OUTPATIENT)
Dept: OBGYN CLINIC | Facility: CLINIC | Age: 57
End: 2022-06-28

## 2022-06-28 VITALS
SYSTOLIC BLOOD PRESSURE: 120 MMHG | WEIGHT: 220 LBS | HEIGHT: 72 IN | DIASTOLIC BLOOD PRESSURE: 80 MMHG | BODY MASS INDEX: 29.8 KG/M2

## 2022-06-28 DIAGNOSIS — M25.572 PAIN, JOINT, ANKLE AND FOOT, LEFT: ICD-10-CM

## 2022-06-28 DIAGNOSIS — M25.561 PATELLOFEMORAL ARTHRALGIA OF RIGHT KNEE: Primary | ICD-10-CM

## 2022-06-28 PROCEDURE — 1036F TOBACCO NON-USER: CPT | Performed by: PHYSICIAN ASSISTANT

## 2022-06-28 PROCEDURE — 99203 OFFICE O/P NEW LOW 30 MIN: CPT | Performed by: PHYSICIAN ASSISTANT

## 2022-06-28 PROCEDURE — 20610 DRAIN/INJ JOINT/BURSA W/O US: CPT | Performed by: PHYSICIAN ASSISTANT

## 2022-06-28 PROCEDURE — 3008F BODY MASS INDEX DOCD: CPT | Performed by: PHYSICIAN ASSISTANT

## 2022-06-28 RX ORDER — TRIAMCINOLONE ACETONIDE 40 MG/ML
40 INJECTION, SUSPENSION INTRA-ARTICULAR; INTRAMUSCULAR
Status: COMPLETED | OUTPATIENT
Start: 2022-06-28 | End: 2022-06-28

## 2022-06-28 RX ORDER — LIDOCAINE HYDROCHLORIDE 10 MG/ML
4 INJECTION, SOLUTION INFILTRATION; PERINEURAL
Status: COMPLETED | OUTPATIENT
Start: 2022-06-28 | End: 2022-06-28

## 2022-06-28 RX ADMIN — LIDOCAINE HYDROCHLORIDE 4 ML: 10 INJECTION, SOLUTION INFILTRATION; PERINEURAL at 10:10

## 2022-06-28 RX ADMIN — TRIAMCINOLONE ACETONIDE 40 MG: 40 INJECTION, SUSPENSION INTRA-ARTICULAR; INTRAMUSCULAR at 10:10

## 2022-06-28 NOTE — TELEPHONE ENCOUNTER
Patient called requesting to speak with Dr Kaden Harden regarding the immediate necessity of having her D&C done as scheduled on 7/5/22  Patient states she has a $2600 deductible  Questions if she can wait and have another ultrasound

## 2022-06-28 NOTE — PROGRESS NOTES
Patient Name:  Elke Leone  MRN:  [id number]    Assessment & Plan     Right knee patellofemoral pain/DJD, left ankle pain  1  Corticosteroid injection performed today into the right knee  2  Referral to physical therapy  3  Continue over-the-counter anti-inflammatories as needed  4  Activities as tolerated with modification to avoid pain  5  Follow-up in six weeks for repeat evaluation  Consider MRI of the knee at that time as indicated  Chief Complaint     Right Knee Pain, left ankle pain    History of the Present Illness     Elke Leone is a 56 y o  female who reports to the office today for evaluation of her right knee  Patient sustained a mechanical fall onto her right knee in March while visiting [place]  Since then she notes persistent pain in the knee  Pain is generalized to the anterior and posterior aspect of the knee  Pain is worse with twisting and pivoting as well as walking up and down steps  She notes swelling and stiffness as well  Pain is also worse with deep flexion  She has been taking over-the-counter anti-inflammatories and utilizing Voltaren gel with mild improvement  No numbness or tingling  No fevers or chills  She has also been using a compressive knee sleeve with mild improvement as well  She also notes left ankle pain  She states pain is intermittent and deep within the ankle  She feels the pain may be related to favoring the right lower extremity due to her right knee pain  She also has a history of a left lower extremity fracture as a child which was treated conservatively in a long-leg cast   No numbness or tingling in the left lower extremity  No instability in the ankle  No fevers or chills  Physical Exam     /80   Ht 6' (1 829 m)   Wt 99 8 kg (220 lb)   LMP  (LMP Unknown)   BMI 29 84 kg/m²     Right knee:  No gross deformity  Skin intact  No erythema ecchymosis or swelling  Trace effusion  No joint line tenderness  Full range of motion with discomfort noted at terminal flexion  Crepitation noted in the patellofemoral compartment with passive range of motion  Stable to varus and valgus stress  Stable Lachman test   Negative posterior drawer test   Negative Wanda's test     Left ankle:  No gross deformity  Skin intact  No erythema ecchymosis or swelling  No tenderness to palpation distal fibula and medial malleolus  No tenderness to palpation Achilles tendon, peroneal tendons, ATFL, or deltoid ligaments  Full ankle range of motion without pain  Negative talar tilt test   Negative anterior drawer test   Negative squeeze test     Eyes: Anicteric sclerae  ENT: Trachea midline  Lungs: Normal respiratory effort  CV: Capillary refill is less than 2 seconds  Skin: Intact without erythema  Lymph: No palpable lymphadenopathy  Neuro: Sensation is grossly intact to light touch  Psych: Mood and affect are appropriate  Data Review     I have personally reviewed pertinent films in PACS, and my interpretation follows:    X-rays right knee 6/13/22:  No acute osseous abnormality  No fracture or dislocation  No significant degenerative changes  X-rays left ankle 6/28/22:  No acute osseous abnormality  No fracture or dislocation  Mild degenerative changes ankle joint  Calcific density adjacent to the medial malleolus suggestive of prior injury  Past Medical History:   Diagnosis Date    Abnormal Pap smear of cervix     Acute upper respiratory infection     Allergic rhinitis     Amenorrhea     Obesity     Perimenopausal symptoms     PONV (postoperative nausea and vomiting)        Past Surgical History:   Procedure Laterality Date    COLONOSCOPY N/A 5/22/2018    Procedure: COLONOSCOPY;  Surgeon: Kristal Chang MD;  Location: [facility] GI [facility];   Service: Gastroenterology    MOUTH SURGERY      TONSILLECTOMY      TOOTH EXTRACTION         No Known Allergies    Current Outpatient Medications on File Prior to Visit Medication Sig Dispense Refill    albuterol (Ventolin HFA) 90 mcg/act inhaler Inhale 2 puffs every 6 (six) hours as needed for wheezing 18 g 5    BD Pen Needle Nikki 2nd Gen 32G X 4 MM MISC USE ONCE DAILY AS DIRECTED 100 each 1    cholecalciferol (VITAMIN D3) 1,000 units tablet Take 1,000 Units by mouth daily      Diclofenac Sodium (VOLTAREN) 1 % Apply 4 g topically 3 (three) times a day as needed (pain) for up to 14 days 150 g 0    estradiol (Climara) 0 05 mg/24 hr Place 1 patch on the skin once a week (Patient not taking: Reported on 3/14/2022 ) 12 patch 3    estradiol (ESTRACE) 0 1 mg/g vaginal cream Insert 1 g into the vagina 2 (two) times a week 42 5 g 1    Flovent  MCG/ACT inhaler INHALE 2 PUFFS BY MOUTH 2 TIMES A DAY RINSE MOUTH AFTER USE  12 g 3    levothyroxine 100 mcg tablet Take 100 mcg by mouth every morning Take on an empty stomach      Misc Natural Products (FIBER 7 PO) Take by mouth      multivitamin (THERAGRAN) TABS Take 1 tablet by mouth daily      Probiotic Product (PROBIOTIC-10 PO) Take by mouth      progesterone (PROMETRIUM) 100 MG capsule One daily at bedtime (Patient taking differently: 150 mg One daily at bedtime ) 90 capsule 3    Progesterone 100 MG CAPS Take 1 capsule by mouth daily at bedtime 90 capsule 3    Saxenda injection INJECT 0 5 ML (3 MG TOTAL) UNDER THE SKIN DAILY 3 mL 3     No current facility-administered medications on file prior to visit         Social History     Tobacco Use    Smoking status: Former Smoker    Smokeless tobacco: Current User   Vaping Use    Vaping Use: Never used   Substance Use Topics    Alcohol use: Yes     Comment: social alcohol use    Drug use: No       Family History   Problem Relation Age of Onset    Heart disease Father     Heart attack Father         myocardial infarction    Other Father         cardiac disorder    Arthritis Maternal Grandmother     Heart disease Maternal Grandmother     Other Maternal Grandmother cardiac disorder    No Known Problems Mother         Well    No Known Problems Paternal Grandmother     No Known Problems Half-Sister     No Known Problems Half-Sister     No Known Problems Half-Sister     Cancer Maternal Aunt 28        Vulvular     No Known Problems Maternal Grandfather     No Known Problems Paternal Grandfather     No Known Problems Half-Brother     No Known Problems Half-Brother     No Known Problems Half-Brother     No Known Problems Half-Brother     No Known Problems Maternal Uncle        Review of Systems     As stated in the HPI  All other systems reviewed and are negative        Large joint arthrocentesis: R knee  Procedure Details  Location: knee - R knee  Needle size: 22 G  Ultrasound guidance: no  Approach: anterolateral  Medications administered: 4 mL lidocaine 1 %; 40 mg triamcinolone acetonide 40 mg/mL    Patient tolerance: patient tolerated the procedure well with no immediate complications  Dressing:  Sterile dressing applied

## 2022-06-28 NOTE — TELEPHONE ENCOUNTER
I tried to call the patient and I left a message for her  Her endometrium is mildly thickened  We had discussed sampling it because she has had bleeding although she is technically never gone 1 whole year without a menstrual cycle, I suspect that she is most likely menopausal at this point  She has been on hormone replacement therapy so that also makes it difficult to tell  The options are to do an office endometrial biopsy and we could pre treat her with Cytotec which may help with the discomfort  It is not routine to repeat the ultrasound however I do not think it is completely unreasonable  We could repeat it in 3 months  I can also touch base with Dr Jesse Lanes and see if he has any input on this  I told her to call the office back and that I would leave you a detailed message so you can give her the information but that I can call her back  Just let me know what she would like    thanks

## 2022-06-29 NOTE — TELEPHONE ENCOUNTER
Rachael Molina cx her case    patient is going to come in that same day in office and have the bx done    cytotec ordered for you to sign  Angela Sharp

## 2022-06-30 DIAGNOSIS — N88.2 CERVICAL STENOSIS (UTERINE CERVIX): Primary | ICD-10-CM

## 2022-06-30 RX ORDER — MISOPROSTOL 100 UG/1
TABLET ORAL
Qty: 2 TABLET | Refills: 0 | Status: SHIPPED | OUTPATIENT
Start: 2022-06-30

## 2022-07-05 ENCOUNTER — OFFICE VISIT (OUTPATIENT)
Dept: OBGYN CLINIC | Facility: CLINIC | Age: 57
End: 2022-07-05
Payer: COMMERCIAL

## 2022-07-05 DIAGNOSIS — N95.0 POST-MENOPAUSAL BLEEDING: Primary | ICD-10-CM

## 2022-07-05 DIAGNOSIS — Z01.818 PREOPERATIVE TESTING: ICD-10-CM

## 2022-07-05 PROCEDURE — 88305 TISSUE EXAM BY PATHOLOGIST: CPT | Performed by: PATHOLOGY

## 2022-07-05 PROCEDURE — 58100 BIOPSY OF UTERUS LINING: CPT | Performed by: OBSTETRICS & GYNECOLOGY

## 2022-07-05 PROCEDURE — 81025 URINE PREGNANCY TEST: CPT | Performed by: OBSTETRICS & GYNECOLOGY

## 2022-07-05 NOTE — PROGRESS NOTES
Endometrial biopsy    Date/Time: 7/5/2022 6:55 PM  Performed by: Taylor Blackburn DO  Authorized by: Taylor Blackburn DO   Universal Protocol:  Consent: Verbal consent obtained  Written consent obtained  Consent given by: patient  Patient understanding: patient states understanding of the procedure being performed  Patient identity confirmed: verbally with patient      Indication:     Indications: Post-menopausal bleeding      Indications comment: Thickened endometrium    Chronicity of post-menopausal bleeding:  Recurrent    Progression of post-menopausal bleeding:  Waxing and waning  Procedure:     Procedure: endometrial biopsy with Pipelle      A bivalve speculum was placed in the vagina: yes      Cervix cleaned and prepped: yes      The cervix was dilated: no      Uterus sounded: yes      Uterus sound depth (cm):  7    Specimen collected: specimen collected and sent to pathology    Findings:     Uterus size:  Non-gravid    Cervix: normal      Adnexa: normal    Comments:     Procedure comments:  Endometrial biopsy done without difficulty although patient was very uncomfortable  Sample obtained, will await results  She is on bio identical hormone replacement therapy with Dr Carlos Jensen  She remains frustrated with difficulty losing weight  She is continuing to exercise and watch her diet

## 2022-07-13 ENCOUNTER — HOSPITAL ENCOUNTER (OUTPATIENT)
Dept: MAMMOGRAPHY | Facility: MEDICAL CENTER | Age: 57
Discharge: HOME | End: 2022-07-13
Payer: COMMERCIAL

## 2022-07-13 VITALS — HEIGHT: 72 IN | BODY MASS INDEX: 29.8 KG/M2 | WEIGHT: 220.02 LBS

## 2022-07-13 DIAGNOSIS — Z12.31 ENCOUNTER FOR SCREENING MAMMOGRAM FOR BREAST CANCER: ICD-10-CM

## 2022-07-13 PROCEDURE — 77067 SCR MAMMO BI INCL CAD: CPT

## 2022-07-13 PROCEDURE — 77063 BREAST TOMOSYNTHESIS BI: CPT

## 2022-07-15 DIAGNOSIS — R92.8 ABNORMAL MAMMOGRAM: Primary | ICD-10-CM

## 2022-07-21 ENCOUNTER — HOSPITAL ENCOUNTER (OUTPATIENT)
Dept: ULTRASOUND IMAGING | Facility: CLINIC | Age: 57
Discharge: HOME | End: 2022-07-21
Payer: COMMERCIAL

## 2022-07-21 VITALS — WEIGHT: 225.97 LBS | HEIGHT: 72 IN | BODY MASS INDEX: 30.61 KG/M2

## 2022-07-21 DIAGNOSIS — R92.8 ABNORMAL MAMMOGRAM: ICD-10-CM

## 2022-07-21 PROCEDURE — 76642 ULTRASOUND BREAST LIMITED: CPT

## 2022-11-02 ENCOUNTER — OFFICE VISIT (OUTPATIENT)
Dept: FAMILY MEDICINE CLINIC | Facility: CLINIC | Age: 57
End: 2022-11-02

## 2022-11-02 VITALS
HEIGHT: 72 IN | BODY MASS INDEX: 31.97 KG/M2 | SYSTOLIC BLOOD PRESSURE: 120 MMHG | DIASTOLIC BLOOD PRESSURE: 86 MMHG | WEIGHT: 236 LBS

## 2022-11-02 DIAGNOSIS — R10.9 ACUTE FLANK PAIN: Primary | ICD-10-CM

## 2022-11-02 NOTE — PROGRESS NOTES
Assessment/Plan:    No problem-specific Assessment & Plan notes found for this encounter  Problem List Items Addressed This Visit    None     Visit Diagnoses     Acute flank pain    -  Primary    Relevant Orders    US kidney and bladder    Urinalysis with microscopic    XR spine lumbar minimum 4 views non injury    XR hip/pelv 2-3 vws right if performed        muscular worse versus urological specifically kidney stone is considered  X-ray would be ordered  Neurolysis in kidney and bladder ultrasound  Encourage hydration  Patient may use topical NSAID to help with discomfort  Since the pain is mild I would hold off on prescribing muscle relaxer  Warm compresses will be good  Patient is traveling  Would like to get the above as soon as possible  Subjective:      Patient ID: Serafin Cruz is a 56 y o  female  HPI  Patient is here for an acute visit complaining right lower back pain started on Monday  Be working out this a routine that she has always done  She woke up on Monday with the discomfort as above  It is mild-to-moderate wraps around to the front to the groin area  Denies any frequency urgency hematuria  Denies any fever chills  No rashes noted  She did go for a 3 mi walk yesterday and did not have any restriction however did notice it again  The following portions of the patient's history were reviewed and updated as appropriate: allergies, current medications, past medical history, past social history and problem list     Review of Systems      Objective:      /86 (BP Location: Left arm, Patient Position: Sitting, Cuff Size: Adult)   Ht 6' (1 829 m)   Wt 107 kg (236 lb)   BMI 32 01 kg/m²          Physical Exam  Constitutional:       General: She is not in acute distress  Appearance: She is not ill-appearing or toxic-appearing  Musculoskeletal:      Comments: Negative spine tenderness    Dependence is right above posterior part of iliac crest  Side-to-side rotation causes muscle pull  Negative CVA tenderness  Right leg is slightly shorter than the left  Good range of motion of right hip  No limitation with internal external rotation or flexion of right hip   Skin:     Findings: No rash  Neurological:      Mental Status: She is alert

## 2022-11-04 ENCOUNTER — HOSPITAL ENCOUNTER (OUTPATIENT)
Dept: ULTRASOUND IMAGING | Facility: HOSPITAL | Age: 57
Discharge: HOME | End: 2022-11-04

## 2022-11-04 DIAGNOSIS — R10.9 ACUTE FLANK PAIN: ICD-10-CM

## 2022-11-08 ENCOUNTER — TELEPHONE (OUTPATIENT)
Dept: FAMILY MEDICINE CLINIC | Facility: CLINIC | Age: 57
End: 2022-11-08

## 2022-11-08 NOTE — TELEPHONE ENCOUNTER
Nighat Khan from [place] [address] Radiology dept called and stated that the patient didn't have her X-ray done of her hip or lumbar spine  Nighat Khan stated that a new order needs to be placed for both if patient still wants the X-ray done    Please advise

## 2022-11-10 ENCOUNTER — TELEPHONE (OUTPATIENT)
Dept: FAMILY MEDICINE CLINIC | Facility: CLINIC | Age: 57
End: 2022-11-10

## 2022-11-10 NOTE — TELEPHONE ENCOUNTER
----- Message from Hortensia Alejandro MD sent at 11/9/2022  3:19 PM EST -----  Finally we have all results  The x-ray shows degenerative changes in the lumbar area as well as right hip  Kidney ultrasound was unremarkable for stone    Thanks for pulling the images

## 2022-11-11 ENCOUNTER — TELEPHONE (OUTPATIENT)
Dept: FAMILY MEDICINE CLINIC | Facility: CLINIC | Age: 57
End: 2022-11-11

## 2022-11-14 DIAGNOSIS — M62.838 MUSCLE SPASM: Primary | ICD-10-CM

## 2022-11-14 RX ORDER — CYCLOBENZAPRINE HCL 5 MG
5 TABLET ORAL 2 TIMES DAILY PRN
Qty: 30 TABLET | Refills: 0 | Status: SHIPPED | OUTPATIENT
Start: 2022-11-14

## 2022-11-14 NOTE — TELEPHONE ENCOUNTER
The annotation for x-rays are in the chart: xray shows degenerative changes in the lumbar area as well as right hip  Kidney ultrasound was unremarkable for stone

## 2022-11-17 DIAGNOSIS — M51.36 DEGENERATIVE DISC DISEASE, LUMBAR: Primary | ICD-10-CM

## 2022-11-17 DIAGNOSIS — M41.9 MILD SCOLIOSIS: ICD-10-CM

## 2022-12-14 RX ORDER — LIRAGLUTIDE 6 MG/ML
3 INJECTION, SOLUTION SUBCUTANEOUS DAILY
Qty: 3 ML | Refills: 3 | Status: SHIPPED | OUTPATIENT
Start: 2022-12-14

## 2023-03-09 ENCOUNTER — APPOINTMENT (OUTPATIENT)
Dept: LAB | Facility: CLINIC | Age: 58
End: 2023-03-09

## 2023-03-09 DIAGNOSIS — E03.9 MYXEDEMA HEART DISEASE: ICD-10-CM

## 2023-03-09 DIAGNOSIS — I51.9 MYXEDEMA HEART DISEASE: ICD-10-CM

## 2023-03-09 DIAGNOSIS — N95.9 MENOPAUSAL PROBLEM: ICD-10-CM

## 2023-03-09 LAB
25(OH)D3 SERPL-MCNC: 21.9 NG/ML (ref 30–100)
ALBUMIN SERPL BCP-MCNC: 3.9 G/DL (ref 3.5–5)
ALP SERPL-CCNC: 63 U/L (ref 46–116)
ALT SERPL W P-5'-P-CCNC: 20 U/L (ref 12–78)
ANION GAP SERPL CALCULATED.3IONS-SCNC: 6 MMOL/L (ref 4–13)
AST SERPL W P-5'-P-CCNC: 16 U/L (ref 5–45)
BASOPHILS # BLD AUTO: 0.03 THOUSANDS/ÂΜL (ref 0–0.1)
BASOPHILS NFR BLD AUTO: 1 % (ref 0–1)
BILIRUB SERPL-MCNC: 0.36 MG/DL (ref 0.2–1)
BUN SERPL-MCNC: 18 MG/DL (ref 5–25)
CALCIUM SERPL-MCNC: 9.7 MG/DL (ref 8.3–10.1)
CHLORIDE SERPL-SCNC: 106 MMOL/L (ref 96–108)
CHOLEST SERPL-MCNC: 166 MG/DL
CO2 SERPL-SCNC: 25 MMOL/L (ref 21–32)
CORTIS SERPL-MCNC: 9.8 UG/DL
CREAT SERPL-MCNC: 0.81 MG/DL (ref 0.6–1.3)
EOSINOPHIL # BLD AUTO: 0.06 THOUSAND/ÂΜL (ref 0–0.61)
EOSINOPHIL NFR BLD AUTO: 1 % (ref 0–6)
ERYTHROCYTE [DISTWIDTH] IN BLOOD BY AUTOMATED COUNT: 12 % (ref 11.6–15.1)
ESTRADIOL SERPL-MCNC: <11 PG/ML
GFR SERPL CREATININE-BSD FRML MDRD: 82 ML/MIN/1.73SQ M
GLUCOSE P FAST SERPL-MCNC: 76 MG/DL (ref 65–99)
HCT VFR BLD AUTO: 44.8 % (ref 34.8–46.1)
HDLC SERPL-MCNC: 54 MG/DL
HGB BLD-MCNC: 14.4 G/DL (ref 11.5–15.4)
IMM GRANULOCYTES # BLD AUTO: 0.01 THOUSAND/UL (ref 0–0.2)
IMM GRANULOCYTES NFR BLD AUTO: 0 % (ref 0–2)
INSULIN SERPL-ACNC: 18.8 MU/L (ref 3–25)
LDLC SERPL CALC-MCNC: 102 MG/DL (ref 0–100)
LYMPHOCYTES # BLD AUTO: 1.43 THOUSANDS/ÂΜL (ref 0.6–4.47)
LYMPHOCYTES NFR BLD AUTO: 25 % (ref 14–44)
MCH RBC QN AUTO: 31 PG (ref 26.8–34.3)
MCHC RBC AUTO-ENTMCNC: 32.1 G/DL (ref 31.4–37.4)
MCV RBC AUTO: 96 FL (ref 82–98)
MONOCYTES # BLD AUTO: 0.35 THOUSAND/ÂΜL (ref 0.17–1.22)
MONOCYTES NFR BLD AUTO: 6 % (ref 4–12)
NEUTROPHILS # BLD AUTO: 3.92 THOUSANDS/ÂΜL (ref 1.85–7.62)
NEUTS SEG NFR BLD AUTO: 67 % (ref 43–75)
NONHDLC SERPL-MCNC: 112 MG/DL
NRBC BLD AUTO-RTO: 0 /100 WBCS
PLATELET # BLD AUTO: 278 THOUSANDS/UL (ref 149–390)
PMV BLD AUTO: 9.5 FL (ref 8.9–12.7)
POTASSIUM SERPL-SCNC: 4 MMOL/L (ref 3.5–5.3)
PROGEST SERPL-MCNC: 7.1 NG/ML
PROT SERPL-MCNC: 7.3 G/DL (ref 6.4–8.4)
RBC # BLD AUTO: 4.65 MILLION/UL (ref 3.81–5.12)
SODIUM SERPL-SCNC: 137 MMOL/L (ref 135–147)
T3FREE SERPL-MCNC: 2.42 PG/ML (ref 2.3–4.2)
T4 FREE SERPL-MCNC: 1.27 NG/DL (ref 0.76–1.46)
TRIGL SERPL-MCNC: 50 MG/DL
TSH SERPL DL<=0.05 MIU/L-ACNC: 0.73 UIU/ML (ref 0.45–4.5)
WBC # BLD AUTO: 5.8 THOUSAND/UL (ref 4.31–10.16)

## 2023-03-10 LAB
DHEA-S SERPL-MCNC: 97.3 UG/DL (ref 29.4–220.5)
EST. AVERAGE GLUCOSE BLD GHB EST-MCNC: 97 MG/DL
HBA1C MFR BLD: 5 %

## 2023-03-11 LAB
TESTOST FREE SERPL-MCNC: 1.7 PG/ML (ref 0–4.2)
TESTOST SERPL-MCNC: 23 NG/DL (ref 4–50)

## 2023-03-13 LAB — ESTRONE SERPL-MCNC: 26 PG/ML

## 2023-06-23 ENCOUNTER — TELEPHONE (OUTPATIENT)
Dept: FAMILY MEDICINE CLINIC | Facility: CLINIC | Age: 58
End: 2023-06-23

## 2023-06-23 DIAGNOSIS — R73.01 IMPAIRED FASTING GLUCOSE: Primary | ICD-10-CM

## 2023-06-23 NOTE — TELEPHONE ENCOUNTER
Patient called stating that she is on her 2 month of wegovy and is not able to get the medication I did refer her to [facility] pharmacy and they told her that it wouldn't be until July 1 st.. patient is requesting the medication to be changed to Ozempic she states that she is insulin resistant and would prefer the ozempic.

## 2023-07-18 DIAGNOSIS — R73.01 IMPAIRED FASTING GLUCOSE: Primary | ICD-10-CM

## 2023-08-03 DIAGNOSIS — R73.01 IMPAIRED FASTING GLUCOSE: ICD-10-CM

## 2023-10-10 DIAGNOSIS — R73.01 IMPAIRED FASTING GLUCOSE: ICD-10-CM

## 2023-10-11 RX ORDER — SEMAGLUTIDE 2.68 MG/ML
INJECTION, SOLUTION SUBCUTANEOUS
Qty: 3 ML | Refills: 1 | Status: SHIPPED | OUTPATIENT
Start: 2023-10-11

## 2023-10-16 NOTE — ADDENDUM NOTE
Addended by: Jose Montgomery on: 11/22/2019 10:12 AM     Modules accepted: Orders Destiny Pierre Patient Age: 84 year old  MESSAGE: Interpreting service used: No    Insurance on file confirmed with caller: No- N/A    IM/FP- Home Health- Orders      Name on Home Health Agency:     Name of orders being requested: NURSING, PT, ST    Reason for requested order: [facility]    Fax Number (if caller requesting order to be faxed): N/A    Is the patient having any symptoms at the time of call? No-     Does caller require a call back: No    Route message to provider's clinical support pool    Message read back to caller for accuracy: Yes       ALLERGIES:  Tetanus immune globulin, Tetanus toxoids, Erythromycin, and Sulfa antibiotics  Current Outpatient Medications   Medication Sig Dispense Refill   • tamsulosin (FLOMAX) 0.4 MG Cap Take 2 capsules by mouth at bedtime. 120 capsule 0   • pantoprazole (PROTONIX) 40 MG tablet Take 1 tablet by mouth in the morning and 1 tablet in the evening. 60 tablet 0   • levETIRAcetam (KepPRA) 250 MG tablet Take 1 tablet by mouth in the morning and 1 tablet in the evening. Stop date: 10/18/23 8 tablet 0   • acetaminophen (TYLENOL) 500 MG tablet Take 1 tablet by mouth every 6 hours as needed.     • ferrous sulfate 325 (65 FE) MG tablet Take 325 mg by mouth daily (with breakfast).     • predniSONE (DELTASONE) 5 MG tablet 1 daily 90 tablet 1   • lidocaine-prilocaine (EMLA) cream Apply to mediport 45 min prior to chest wall. 30 g 0   • Multiple Vitamin (MULTIVITAMIN) capsule Take 1 tablet by mouth.     • calcium carbonate (OS-BURTON) 1250 (500 Ca) MG tablet        No current facility-administered medications for this visit.     PHARMACY to use: N/A          Pharmacy preference(s) on file:   [id number] - [place], [place] - [address]  [address] [address]  Phone: [phone] Fax: [phone]      CALL BACK INFO: DO NOT LEAVE A MESSAGE - contact patient directly      PCP: Merle Kilpatrick MD         INS: Payor: MEDICARE / Plan: PARTA AND B / Product  Type: MEDICARE   PATIENT ADDRESS:  [address] [place] [address]

## 2023-11-06 ENCOUNTER — TELEPHONE (OUTPATIENT)
Dept: FAMILY MEDICINE CLINIC | Facility: CLINIC | Age: 58
End: 2023-11-06

## 2023-11-06 NOTE — TELEPHONE ENCOUNTER
Patient called in regards of lab results that she would like us to have. Patient would like to email results, I did leave a message stating that she would need to fax over results or drop them off.

## 2023-11-08 ENCOUNTER — TELEPHONE (OUTPATIENT)
Dept: FAMILY MEDICINE CLINIC | Facility: CLINIC | Age: 58
End: 2023-11-08

## 2023-11-08 DIAGNOSIS — R79.89 HIGH SERUM CORTISOL: Primary | ICD-10-CM

## 2023-11-08 NOTE — TELEPHONE ENCOUNTER
Patient called left message on nurse line:Hi there, This is Janelle Quiroga. I spoke to you guys earlier today about my cortisol levels and an appointment I have on 10:45 on Friday. I just heard back from the nurse practitioner at the [facility] Who said that she thought it would be a good idea. They have something called a methyl zone test done that would tell you gualonso where this cortisol was coming from. So she said I'd have to get a medication and take it overnight and then have a blood test in the morning. So I don't know if it's possible to, if you guys even want to do that, to write the script for that and I can go get it. And that way when I come see you on Friday, you'll already have these results My number is [phone]. Thanks and have a good day.

## 2023-11-10 ENCOUNTER — OFFICE VISIT (OUTPATIENT)
Dept: FAMILY MEDICINE CLINIC | Facility: CLINIC | Age: 58
End: 2023-11-10
Payer: COMMERCIAL

## 2023-11-10 VITALS
WEIGHT: 234 LBS | TEMPERATURE: 96.6 F | HEART RATE: 61 BPM | HEIGHT: 72 IN | BODY MASS INDEX: 31.69 KG/M2 | DIASTOLIC BLOOD PRESSURE: 80 MMHG | RESPIRATION RATE: 16 BRPM | OXYGEN SATURATION: 96 % | SYSTOLIC BLOOD PRESSURE: 122 MMHG

## 2023-11-10 DIAGNOSIS — Z00.00 ANNUAL PHYSICAL EXAM: Primary | ICD-10-CM

## 2023-11-10 PROCEDURE — 99396 PREV VISIT EST AGE 40-64: CPT | Performed by: INTERNAL MEDICINE

## 2023-11-10 NOTE — LETTER
November 10, 2023     Patient: Roberto Youngblood  YOB: 1965  Date of Visit: 11/10/2023      To Whom it May Concern:    Brissa Hamilton is under my professional care. Kiersten Smith was seen in my office on 11/10/2023. Kiersten Smith may return to work on 11/13/2023 . If you have any questions or concerns, please don't hesitate to call.          Sincerely,          Jeffy Rodriguez MD        CC: No Recipients

## 2023-11-10 NOTE — PROGRESS NOTES
ADULT ANNUAL [facility] PRIMARY CARE [facility]    NAME: Thanh Gonsalez  AGE: 66 y.o. SEX: female  : 1965     DATE: 11/10/2023     Assessment and Plan:     Problem List Items Addressed This Visit    None  Visit Diagnoses       Annual physical exam    -  Primary              Immunizations and preventive care screenings were discussed with patient today. Appropriate education was printed on patient's after visit summary. Counseling:  See discussion below      Depression Screening and Follow-up Plan: Patient was screened for depression during today's encounter. They screened negative with a PHQ-2 score of 0. No follow-ups on file. Chief Complaint:     Chief Complaint   Patient presents with    Physical Exam     Discuss Labs. Patient would like  for visit. No questions or concerns. JT    BMI     Due. History of Present Illness:     Adult Annual Physical   Patient here for a comprehensive physical exam. The patient reports problems - as below . The patient is here for annual physical.  She is up-to-date with mammogram and colonoscopy 2018 and a small single polyp was removed which was adenoma. Patient was asked to come back in 5 years. Blood pressure is good today. Patient has been under care of Dr. Jeremiah Lorenzo for her hormones assessment and had  salivary cortisol was told it was quite elevated. I was asked to to follow-up lab testing which at this point I would defer to endocrinology for further assistance. Patient recently started [facility]     Diet and Physical Activity  Diet/Nutrition: low calorie diet. Exercise: moderate cardiovascular exercise.       Depression Screening  PHQ-2/9 Depression Screening    Little interest or pleasure in doing things: 0 - not at all  Feeling down, depressed, or hopeless: 0 - not at all  PHQ-2 Score: 0  PHQ-2 Interpretation: Negative depression screen       General Health  Sleep: Has been struggling with sleep since she heard about this . Hearing: normal - bilateral.  Vision: no vision problems. Dental: brushes teeth twice daily. /GYN Health  Patient is: postmenopausal  Last menstrual period: As above  Contraceptive method:  As above . Advanced Care Planning  Do you have an advanced directive? no  Do you have a durable medical power of ? yes     Review of Systems:     Review of Systems   Past Medical History:     Past Medical History:   Diagnosis Date    Abnormal Pap smear of cervix     Acute upper respiratory infection     Allergic rhinitis     Amenorrhea     Obesity     Perimenopausal symptoms     PONV (postoperative nausea and vomiting)       Past Surgical History:     Past Surgical History:   Procedure Laterality Date    COLONOSCOPY N/A 5/22/2018    Procedure: COLONOSCOPY;  Surgeon: Opal Foster MD;  Location: [facility] GI LAB;   Service: Gastroenterology    MOUTH SURGERY      TONSILLECTOMY      TOOTH EXTRACTION        Social History:     Social History     Socioeconomic History    Marital status: /Civil Union     Spouse name: None    Number of children: 0    Years of education: None    Highest education level: None   Occupational History    None   Tobacco Use    Smoking status: Former    Smokeless tobacco: Current   Vaping Use    Vaping Use: Never used   Substance and Sexual Activity    Alcohol use: Yes     Comment: social alcohol use    Drug use: No    Sexual activity: Yes     Partners: Male     Comment: does not use birth control   Other Topics Concern    None   Social History Narrative     Caffeine use    [address] ([employer])    Uses safety equipment-seatbelts     Social Determinants of Health     Financial Resource Strain: Not on file   Food Insecurity: Not on file   Transportation Needs: Not on file   Physical Activity: Not on file   Stress: Not on file   Social Connections: Not on file   Intimate Partner Violence: Not on file   Housing Stability: Not on file      Family History:     Family History   Problem Relation Age of Onset    Heart disease Father     Heart attack Father         myocardial infarction    Other Father         cardiac disorder    Arthritis Maternal Grandmother     Heart disease Maternal Grandmother     Other Maternal Grandmother         cardiac disorder    No Known Problems Mother         Well    No Known Problems Paternal Grandmother     No Known Problems Half-Sister     No Known Problems Half-Sister     No Known Problems Half-Sister     Cancer Maternal Aunt 28        Vulvular     No Known Problems Maternal Grandfather     No Known Problems Paternal Grandfather     No Known Problems Half-Brother     No Known Problems Half-Brother     No Known Problems Half-Brother     No Known Problems Half-Brother     No Known Problems Maternal Uncle       Current Medications:     Current Outpatient Medications   Medication Sig Dispense Refill    BD Pen Needle Nikki 2nd Gen 32G X 4 MM MISC USE ONCE DAILY AS DIRECTED 100 each 1    cholecalciferol (VITAMIN D3) 1,000 units tablet Take 1,000 Units by mouth daily      levothyroxine 100 mcg tablet Take 100 mcg by mouth every morning Take on an empty stomach      multivitamin (THERAGRAN) TABS Take 1 tablet by mouth daily      Progesterone 100 MG CAPS Take 1 capsule by mouth daily at bedtime 90 capsule 3    tirzepatide 2.5 MG/0.5ML Inject 0.5 mL (2.5 mg total) under the skin every 7 days 2 mL 0    albuterol (Ventolin HFA) 90 mcg/act inhaler Inhale 2 puffs every 6 (six) hours as needed for wheezing (Patient not taking: Reported on 11/2/2022) 18 g 5    cyclobenzaprine (FLEXERIL) 5 mg tablet Take 1 tablet (5 mg total) by mouth 2 (two) times a day as needed for muscle spasms (Patient not taking: Reported on 11/10/2023) 30 tablet 0    Diclofenac Sodium (VOLTAREN) 1 % Apply 4 g topically 3 (three) times a day as needed (pain) for up to 14 days 150 g 0    estradiol (Climara) 0.05 mg/24 hr Place 1 patch on the skin once a week (Patient not taking: Reported on 3/14/2022) 12 patch 3    estradiol (ESTRACE) 0.1 mg/g vaginal cream Insert 1 g into the vagina 2 (two) times a week (Patient not taking: Reported on 11/10/2023) 42.5 g 1    Flovent  MCG/ACT inhaler INHALE 2 PUFFS BY MOUTH 2 TIMES A DAY RINSE MOUTH AFTER USE. (Patient not taking: Reported on 11/2/2022) 12 g 3    Misc Natural Products (FIBER 7 PO) Take by mouth      misoprostol (CYTOTEC) 100 mcg tablet Place one tab in the vagina the night before the procedure and the second one 3-4 hours prior to procedure. (Patient not taking: Reported on 11/10/2023) 2 tablet 0    Probiotic Product (PROBIOTIC-10 PO) Take by mouth (Patient not taking: Reported on 11/2/2022)      progesterone (PROMETRIUM) 100 MG capsule One daily at bedtime (Patient not taking: Reported on 11/10/2023) 90 capsule 3    semaglutide, 2 mg/dose, (Ozempic, 2 MG/DOSE,) 8 mg/ mL injection pen INJECT 0.75 ML (2 MG) SUBCUTANEOUSLY EVERY 7 DAYS 3 mL 1     No current facility-administered medications for this visit.       Allergies:     No Known Allergies   Physical Exam:     /80 (BP Location: Left arm, Patient Position: Sitting, Cuff Size: Standard)   Pulse 61   Temp (!) 96.6 °F (35.9 °C) (Tympanic)   Resp 16   Wt 106 kg (234 lb) Comment: Pt weigth at home  SpO2 96%   BMI 31.74 kg/m²     Physical Exam     Filiberto Sexton MD  [address] [facility]

## 2023-11-10 NOTE — PATIENT INSTRUCTIONS
Wellness Visit for Adults   AMBULATORY CARE:   A wellness visit  is when you see your healthcare provider to get screened for health problems. Your healthcare provider will also give you advice on how to stay healthy. Write down your questions so you remember to ask them. Ask your healthcare provider how often you should have a wellness visit. What happens at a wellness visit:  Your healthcare provider will ask about your health, and your family history of health problems. This includes high blood pressure, heart disease, and cancer. He or she will ask if you have symptoms that concern you, if you smoke, and about your mood. You may also be asked about your intake of medicines, supplements, food, and alcohol. Any of the following may be done: Your weight  will be checked. Your height may also be checked so your body mass index (BMI) can be calculated. Your BMI shows if you are at a healthy weight. Your blood pressure  and heart rate will be checked. Your temperature may also be checked. Blood and urine tests  may be done. Blood tests may be done to check your cholesterol levels. Abnormal cholesterol levels increase your risk for heart disease and stroke. You may also need a blood or urine test to check for diabetes if you are at increased risk. Urine tests may be done to look for signs of an infection or kidney disease. A physical exam  includes checking your heartbeat and lungs with a stethoscope. Your healthcare provider may also check your skin to look for sun damage. Screening tests  may be recommended. A screening test is done to check for diseases that may not cause symptoms. The screening tests you may need depend on your age, gender, family history, and lifestyle habits. For example, colorectal screening may be recommended if you are 48years old or older. Screening tests you need if you are a woman:   A Pap smear  is used to screen for cervical cancer.  Pap smears are usually done every 3 to 5 years depending on your age. You may need them more often if you have had abnormal Pap smear test results in the past. Ask your healthcare provider how often you should have a Pap smear. A mammogram  is an x-ray of your breasts to screen for breast cancer. Experts recommend mammograms every 2 years starting at age 48 years. You may need a mammogram at age 52 years or younger if you have an increased risk for breast cancer. Talk to your healthcare provider about when you should start having mammograms and how often you need them. Vaccines you may need:   Get an influenza vaccine  every year. The influenza vaccine protects you from the flu. Several types of viruses cause the flu. The viruses change over time, so new vaccines are made each year. Get a tetanus-diphtheria (Td) booster vaccine  every 10 years. This vaccine protects you against tetanus and diphtheria. Tetanus is a severe infection that may cause painful muscle spasms and lockjaw. Diphtheria is a severe bacterial infection that causes a thick covering in the back of your mouth and throat. Get a human papillomavirus (HPV) vaccine  if you are female and aged 23 to 32 or male 23 to 24 and never received it. This vaccine protects you from HPV infection. HPV is the most common infection spread by sexual contact. HPV may also cause vaginal, penile, and anal cancers. Get a pneumococcal vaccine  if you are aged 72 years or older. The pneumococcal vaccine is an injection given to protect you from pneumococcal disease. Pneumococcal disease is an infection caused by pneumococcal bacteria. The infection may cause pneumonia, meningitis, or an ear infection. Get a shingles vaccine  if you are 60 or older, even if you have had shingles before. The shingles vaccine is an injection to protect you from the varicella-zoster virus. This is the same virus that causes chickenpox.  Shingles is a painful rash that develops in people who had chickenpox or have been exposed to the virus. How to eat healthy:  My Plate is a model for planning healthy meals. It shows the types and amounts of foods that should go on your plate. Fruits and vegetables make up about half of your plate, and grains and protein make up the other half. A serving of dairy is included on the side of your plate. The amount of calories and serving sizes you need depends on your age, gender, weight, and height. Examples of healthy foods are listed below:  Eat a variety of vegetables  such as dark green, red, and orange vegetables. You can also include canned vegetables low in sodium (salt) and frozen vegetables without added butter or sauces. Eat a variety of fresh fruits , canned fruit in 100% juice, frozen fruit, and dried fruit. Include whole grains. At least half of the grains you eat should be whole grains. Examples include whole-wheat bread, wheat pasta, brown rice, and whole-grain cereals such as oatmeal.    Eat a variety of protein foods such as seafood (fish and shellfish), lean meat, and poultry without skin (turkey and chicken). Examples of lean meats include pork leg, shoulder, or tenderloin, and beef round, sirloin, tenderloin, and extra lean ground beef. Other protein foods include eggs and egg substitutes, beans, peas, soy products, nuts, and seeds. Choose low-fat dairy products such as skim or 1% milk or low-fat yogurt, cheese, and cottage cheese. Limit unhealthy fats  such as butter, hard margarine, and shortening. Exercise:  Exercise at least 30 minutes per day on most days of the week. Some examples of exercise include walking, biking, dancing, and swimming. You can also fit in more physical activity by taking the stairs instead of the elevator or parking farther away from stores. Include muscle strengthening activities 2 days each week. Regular exercise provides many health benefits.  It helps you manage your weight, and decreases your risk for type 2 diabetes, heart disease, stroke, and high blood pressure. Exercise can also help improve your mood. Ask your healthcare provider about the best exercise plan for you. General health and safety guidelines:   Do not smoke. Nicotine and other chemicals in cigarettes and cigars can cause lung damage. Ask your healthcare provider for information if you currently smoke and need help to quit. E-cigarettes or smokeless tobacco still contain nicotine. Talk to your healthcare provider before you use these products. Limit alcohol. A drink of alcohol is 12 ounces of beer, 5 ounces of wine, or 1½ ounces of liquor. Lose weight, if needed. Being overweight increases your risk of certain health conditions. These include heart disease, high blood pressure, type 2 diabetes, and certain types of cancer. Protect your skin. Do not sunbathe or use tanning beds. Use sunscreen with a SPF 15 or higher. Apply sunscreen at least 15 minutes before you go outside. Reapply sunscreen every 2 hours. Wear protective clothing, hats, and sunglasses when you are outside. Drive safely. Always wear your seatbelt. Make sure everyone in your car wears a seatbelt. A seatbelt can save your life if you are in an accident. Do not use your cell phone when you are driving. This could distract you and cause an accident. Pull over if you need to make a call or send a text message. Practice safe sex. Use latex condoms if are sexually active and have more than one partner. Your healthcare provider may recommend screening tests for sexually transmitted infections (STIs). Wear helmets, lifejackets, and protective gear. Always wear a helmet when you ride a bike or motorcycle, go skiing, or play sports that could cause a head injury. Wear protective equipment when you play sports. Wear a lifejacket when you are on a boat or doing water sports.     © Copyright [employer] 2023 Information is for End User's use only and may not be sold, redistributed or otherwise used for commercial purposes. The above information is an  only. It is not intended as medical advice for individual conditions or treatments. Talk to your doctor, nurse or pharmacist before following any medical regimen to see if it is safe and effective for you.

## 2023-11-20 RX ORDER — TIRZEPATIDE 2.5 MG/.5ML
INJECTION, SOLUTION SUBCUTANEOUS
Qty: 2.5 ML | Refills: 3 | Status: SHIPPED | OUTPATIENT
Start: 2023-11-20 | End: 2023-11-20

## 2023-12-18 RX ORDER — TIRZEPATIDE 5 MG/.5ML
INJECTION, SOLUTION SUBCUTANEOUS
Qty: 2 ML | Refills: 3 | Status: SHIPPED | OUTPATIENT
Start: 2023-12-18

## 2024-01-03 NOTE — PROGRESS NOTES
"Chief Complaint   Patient presents with    Abnormal Endocrine Labs      Referring Provider  Shelley Barragan Md  [address]  [address]  [place] [address]     History of Present Illness:   Krystal Bruno is a 58 y.o. female with hypothyroidism seen initially in 7/2021 at the request of Dr. Shelley Barragan for hypothyroidism.  She presents today to discuss a new abnormal test result scanned into Media and summarized below.     Testing was done by Dr. Chamorro' office where she gets HRT. This is compounded at [facility].   Post-menopausal compounded cream including progesterone and DHEA and a compounded oral progesterone. She was given \"Adreneve\" supplement to \"Lower the cortisol.\" Label states this includes ashwaganda, Skullcap root, and Eleuthero. She has not taken the Adreneve for about 2 weeks.     She is taking levothyroxine 100mcg daily. She was concnered about weight gain and continues to be concerned about this. She has noted ~25# weight gain since the summer 2023. She has lost muscle strength and she feels \"strong.\" She has not seen facial redness or unusual stretch marks on her abdomen. Labs available are below- Drawn by Dr. Chamorro' office.    10/31/2023  Cortisol 29.7 (nl 4.6-22.0)  ACTH 41 (nl 6-50)  Free T4 1.3 (nl 0.8-1.8)  TSH 2.54  Free T3 3.4    To compare- she has March 2023 labs cortisol 9.8 and DHEAs 97.3 (Drawn at 12pm)      Denies acne or hair growth. Has some hair loss. Has a hx of skin bruising, but not skin tearing.   Stopped lifting weight due to a retinal issues. Still able to exercise otherwise. Sleep continues to be an issue, but she feels it has improved since beginning the post-menopausal HRT.    Also recalls having 4 salivary cortisol tests done in ~Oct 2023 over the course of one day. Does not have this result.     She has not developed HTN or diabetes, but has a strong family hx of diabetes.     Fhx:   diabetes- mother, brother, GGM    Patient Active Problem List "   Diagnosis    Perimenopausal symptoms    Abnormal weight gain    Overweight    Impaired fasting glucose    Colon cancer screening    Gastroesophageal reflux disease without esophagitis    Exposure to COVID-19 virus    Hypothyroidism      Past Medical History:   Diagnosis Date    Abnormal Pap smear of cervix     Acute upper respiratory infection     Allergic rhinitis     Amenorrhea     Obesity     Perimenopausal symptoms     PONV (postoperative nausea and vomiting)       Past Surgical History:   Procedure Laterality Date    COLONOSCOPY N/A 5/22/2018    Procedure: COLONOSCOPY;  Surgeon: Reed Roy MD;  Location: [facility];  Service: Gastroenterology    MOUTH SURGERY      TONSILLECTOMY      TOOTH EXTRACTION        Family History   Problem Relation Age of Onset    Heart disease Father     Heart attack Father         myocardial infarction    Other Father         cardiac disorder    Arthritis Maternal Grandmother     Heart disease Maternal Grandmother     Other Maternal Grandmother         cardiac disorder    No Known Problems Mother         Well    No Known Problems Paternal Grandmother     No Known Problems Half-Sister     No Known Problems Half-Sister     No Known Problems Half-Sister     Cancer Maternal Aunt 35        Vulvular     No Known Problems Maternal Grandfather     No Known Problems Paternal Grandfather     No Known Problems Half-Brother     No Known Problems Half-Brother     No Known Problems Half-Brother     No Known Problems Half-Brother     No Known Problems Maternal Uncle      Social History     Tobacco Use    Smoking status: Former    Smokeless tobacco: Current   Substance Use Topics    Alcohol use: Yes     Comment: social alcohol use     No Known Allergies      Current Outpatient Medications:     levothyroxine 100 mcg tablet, Take 100 mcg by mouth every morning Take on an empty stomach, Disp: , Rfl:     Progesterone 100 MG CAPS, Take 1 capsule by mouth daily at bedtime, Disp: 90 capsule, Rfl:  3    BD Pen Needle Nikki 2nd Gen 32G X 4 MM MISC, USE ONCE DAILY AS DIRECTED, Disp: 100 each, Rfl: 1    cholecalciferol (VITAMIN D3) 1,000 units tablet, Take 1,000 Units by mouth daily, Disp: , Rfl:     cyclobenzaprine (FLEXERIL) 5 mg tablet, Take 1 tablet (5 mg total) by mouth 2 (two) times a day as needed for muscle spasms (Patient not taking: Reported on 11/10/2023), Disp: 30 tablet, Rfl: 0    Diclofenac Sodium (VOLTAREN) 1 %, Apply 4 g topically 3 (three) times a day as needed (pain) for up to 14 days, Disp: 150 g, Rfl: 0    Flovent  MCG/ACT inhaler, INHALE 2 PUFFS BY MOUTH 2 TIMES A DAY RINSE MOUTH AFTER USE. (Patient not taking: Reported on 11/2/2022), Disp: 12 g, Rfl: 3    Misc Natural Products (FIBER 7 PO), Take by mouth, Disp: , Rfl:     multivitamin (THERAGRAN) TABS, Take 1 tablet by mouth daily, Disp: , Rfl:     tirzepatide (Mounjaro) 5 MG/0.5ML, INJECT 0.5 ML (5 MG TOTAL) UNDER THE SKIN EVERY 7 DAYS, Disp: 2 mL, Rfl: 3  Review of Systems   Constitutional:  Positive for unexpected weight change (see HPI).   HENT:  Negative for voice change.    Respiratory:  Negative for shortness of breath.    Cardiovascular:  Negative for chest pain and palpitations.   Musculoskeletal:  Negative for gait problem.   Skin:  Negative for color change.        Denies acne or thinning skin, see also HPI   Neurological:  Negative for weakness.   Hematological:  Bruises/bleeds easily (not changed).   Psychiatric/Behavioral:  Positive for sleep disturbance (insomnia). The patient is not nervous/anxious.    see also HPI    Physical Exam:  Body mass index is 31.74 kg/m².  /72 (BP Location: Left arm, Patient Position: Sitting, Cuff Size: Adult)   Pulse 71   Ht 6' (1.829 m)   SpO2 98%   BMI 31.74 kg/m²    Wt Readings from Last 3 Encounters:   11/10/23 106 kg (234 lb)   11/02/22 107 kg (236 lb)   07/21/22 102 kg (225 lb 15.5 oz)       GEN: NAD  E/n/m: mask in place, hearing intact bilat, tongue midline, lips nl  Eyes:  no stare or proptosis, nl lids and conjunctiva, EOMI  CV; heart reg rate s1s2 nl, no m/r/g appreciated  Resp: CTAB, good effort  Ab+BS  Neuro: no tremor  MS: no c/c in digits, moves all 4 ext, nl muscle bulk, gait nl, strength 5/5 BU and BL prox mm groups; rises from sitting without assistance  Skin: warm and dry, no palmar erythema, no facial plethora, no striae on her abdomen or back  Psych: nl mood and affect, no gross lapses in memory    DATA:  Labs:     See HPI    Radiology    Impression:  1. High serum cortisol             Plan:    Raphael was seen today for abnormal endocrine labs.    Diagnoses and all orders for this visit:    High serum cortisol  -     Ambulatory Referral to Endocrinology  -     SALIVARY CORTISOL,THREE SPECIMENS; Future        Abnormal endocrine testing: I am concerned that the compounded supplements may be giving abnormal results. There are no other signs or symptoms suggestive of hypercortisolemia apart from the weight issues. It is possible that there is a contaminate or other issue causing the cortisol to be elevated. I recommend she stop her compounded supplements for a week and then do 3 Late night salivary cortisols. She is agreeable. Longer duration of stopping may impact her sleep which is a significant concern for her.     Discussed with the patient and all questioned fully answered. She will call me if any problems arise.        Leonor Cassidy MD

## 2024-01-04 ENCOUNTER — OFFICE VISIT (OUTPATIENT)
Dept: ENDOCRINOLOGY | Facility: CLINIC | Age: 59
End: 2024-01-04
Payer: COMMERCIAL

## 2024-01-04 VITALS
DIASTOLIC BLOOD PRESSURE: 72 MMHG | OXYGEN SATURATION: 98 % | BODY MASS INDEX: 31.74 KG/M2 | HEIGHT: 72 IN | HEART RATE: 71 BPM | SYSTOLIC BLOOD PRESSURE: 112 MMHG

## 2024-01-04 DIAGNOSIS — R79.89 HIGH SERUM CORTISOL: ICD-10-CM

## 2024-01-04 PROCEDURE — 99214 OFFICE O/P EST MOD 30 MIN: CPT | Performed by: INTERNAL MEDICINE

## 2024-01-04 NOTE — PATIENT INSTRUCTIONS
Please stop your creams and supplements for one week, then do the three late night salivary cortisols.  You can resume the creams after bringing the samples back to the labs

## 2024-01-13 ENCOUNTER — HOSPITAL ENCOUNTER (OUTPATIENT)
Dept: MAMMOGRAPHY | Facility: MEDICAL CENTER | Age: 59
Discharge: HOME | End: 2024-01-13
Payer: COMMERCIAL

## 2024-01-13 VITALS — HEIGHT: 72 IN | WEIGHT: 234 LBS | BODY MASS INDEX: 31.69 KG/M2

## 2024-01-13 DIAGNOSIS — Z12.31 ENCOUNTER FOR SCREENING MAMMOGRAM FOR MALIGNANT NEOPLASM OF BREAST: ICD-10-CM

## 2024-01-13 PROCEDURE — 77063 BREAST TOMOSYNTHESIS BI: CPT

## 2024-01-13 PROCEDURE — 77067 SCR MAMMO BI INCL CAD: CPT

## 2024-01-17 ENCOUNTER — APPOINTMENT (OUTPATIENT)
Dept: LAB | Facility: CLINIC | Age: 59
End: 2024-01-17
Payer: COMMERCIAL

## 2024-01-17 ENCOUNTER — TELEPHONE (OUTPATIENT)
Dept: FAMILY MEDICINE CLINIC | Facility: CLINIC | Age: 59
End: 2024-01-17

## 2024-01-17 DIAGNOSIS — R79.89 HIGH SERUM CORTISOL: ICD-10-CM

## 2024-01-17 PROCEDURE — 82533 TOTAL CORTISOL: CPT

## 2024-01-21 LAB
CORTIS BS SAL-MCNC: 0.03 UG/DL
CORTIS SP1 P CHAL SAL-MCNC: 0.02 UG/DL
CORTIS SP2 P CHAL SAL-MCNC: 0.03 UG/DL

## 2024-02-17 ENCOUNTER — PATIENT MESSAGE (OUTPATIENT)
Dept: FAMILY MEDICINE CLINIC | Facility: CLINIC | Age: 59
End: 2024-02-17

## 2024-02-21 PROBLEM — Z12.11 COLON CANCER SCREENING: Status: RESOLVED | Noted: 2018-05-02 | Resolved: 2024-02-21

## 2024-02-21 NOTE — PATIENT COMMUNICATION
Patient called to request a refill for their Mounjaro advised a refill was requested on 2/19 and is pending approval. Patient requesting to increase dosage.Patient verbalized understanding and is in agreement.     Please send to [facility]/pharmacy #[id number] - [place] - [address]

## 2024-02-26 NOTE — PATIENT COMMUNICATION
Pt called about her injection, explained to pt that office did reply back on my chart and rx was sent to pharmacy. Pt aware

## 2024-04-24 RX ORDER — TIRZEPATIDE 7.5 MG/.5ML
INJECTION, SOLUTION SUBCUTANEOUS
Qty: 2 ML | Refills: 0 | Status: SHIPPED | OUTPATIENT
Start: 2024-04-24

## 2024-05-03 ENCOUNTER — ANNUAL EXAM (OUTPATIENT)
Dept: GYNECOLOGY | Facility: CLINIC | Age: 59
End: 2024-05-03
Payer: COMMERCIAL

## 2024-05-03 VITALS
BODY MASS INDEX: 31.15 KG/M2 | SYSTOLIC BLOOD PRESSURE: 128 MMHG | WEIGHT: 230 LBS | DIASTOLIC BLOOD PRESSURE: 72 MMHG | HEIGHT: 72 IN

## 2024-05-03 DIAGNOSIS — R23.2 HOT FLASHES: ICD-10-CM

## 2024-05-03 DIAGNOSIS — Z01.419 ENCOUNTER FOR ANNUAL ROUTINE GYNECOLOGICAL EXAMINATION: Primary | ICD-10-CM

## 2024-05-03 DIAGNOSIS — Z12.31 ENCOUNTER FOR SCREENING MAMMOGRAM FOR BREAST CANCER: ICD-10-CM

## 2024-05-03 PROCEDURE — 99396 PREV VISIT EST AGE 40-64: CPT | Performed by: OBSTETRICS & GYNECOLOGY

## 2024-05-03 NOTE — PROGRESS NOTES
Assessment/Plan:    pap is up to date    mammogram reviewed with her including breast density.  RX given  for January   Discussed self breast exams    colon cancer screening - colonoscopy at age 54, recall in 5 years which is later this year    She will see Dr Chamorro next week.  She will discuss possible adjustments in her HRT with him.    Vaginal dryness -we discussed options including oil-based and silicone based lubricant and also vaginal estrogen.    I answered her questions regarding endometrial cancer.  She believes this is what her sister had but she is not sure.  I explained the importance of reporting any abnormal bleeding.  She has not had bleeding since before her last visit in 2022.    discussed preventive care, regular exercise and a healthy diet      No problem-specific Assessment & Plan notes found for this encounter.       Diagnoses and all orders for this visit:    Encounter for annual routine gynecological examination    Encounter for screening mammogram for breast cancer  -     Mammo screening bilateral w 3d & cad; Future          Subjective:      Patient ID: Krystal Bruno is a 58 y.o. female.    Patient here for yearly.  She is on bioidentical hormone replacement therapy with Dr. Chamorro.  She is unsure if she needs this adjusted.  She has difficulty with losing weight.  She was just started on Mounjaro.  She also saw an endocrinologist because she had labs done and her cortisol level was very high.  Her workup was negative.    She is on prometrium, estradiol cream, synthroid and mounjaro.    Normal 3D mammogram in January with fatty tissue and average risk  Normal EMB in July 2022 with inactive endometrium.  This was done because she started HRT when she was menopausal and at times when she had bleeding, I wanted to make sure her endometrium was normal as it was unclear when she was actually in menopause.    Normal Pap, negative HPV in January 2022    Her half sister who was 14 years older  was dx with a gyn cancer.  She thinks it may have been endometrial cancer.  Her sister passed away in January.  She has questions about this.      Seeing Dr Chamorro next week            The following portions of the patient's history were reviewed and updated as appropriate: allergies, current medications, past family history, past medical history, past social history, past surgical history, and problem list.    Review of Systems   Constitutional: Negative.    Gastrointestinal: Negative.    Endocrine: Positive for heat intolerance.   Genitourinary:  Positive for dyspareunia.         Objective:      LMP  (LMP Unknown)          Physical Exam  Vitals reviewed.   Constitutional:       Appearance: Normal appearance. She is well-developed.   Neck:      Thyroid: No thyromegaly.      Trachea: No tracheal deviation.   Cardiovascular:      Rate and Rhythm: Normal rate and regular rhythm.   Pulmonary:      Effort: Pulmonary effort is normal.      Breath sounds: Normal breath sounds.   Chest:   Breasts:     Breasts are symmetrical.      Right: Normal. No inverted nipple, mass, nipple discharge, skin change or tenderness.      Left: Normal. No inverted nipple, mass, nipple discharge, skin change or tenderness.   Abdominal:      General: There is no distension.      Palpations: Abdomen is soft. There is no mass.      Tenderness: There is no abdominal tenderness.   Genitourinary:     Labia:         Right: No rash, tenderness, lesion or injury.         Left: No rash, tenderness, lesion or injury.       Vagina: Normal.      Cervix: Normal. No cervical motion tenderness, discharge or friability.      Uterus: Normal.       Adnexa: Right adnexa normal and left adnexa normal.        Right: No mass, tenderness or fullness.          Left: No mass, tenderness or fullness.        Rectum: Normal.   Neurological:      Mental Status: She is alert.

## 2024-05-21 ENCOUNTER — PATIENT MESSAGE (OUTPATIENT)
Dept: FAMILY MEDICINE CLINIC | Facility: CLINIC | Age: 59
End: 2024-05-21

## 2024-05-22 ENCOUNTER — PATIENT MESSAGE (OUTPATIENT)
Dept: FAMILY MEDICINE CLINIC | Facility: CLINIC | Age: 59
End: 2024-05-22

## 2024-05-23 ENCOUNTER — TELEPHONE (OUTPATIENT)
Age: 59
End: 2024-05-23

## 2024-05-23 NOTE — TELEPHONE ENCOUNTER
PA for Mounjaro 7.5mg     Submitted via    []CMM-KEY   [x][employer]-Case ID # [id number]   []Faxed to plan   []Other website   []Phone call Case ID #     Office notes sent, clinical questions answered. Awaiting determination    Turnaround time for your insurance to make a decision on your Prior Authorization can take 7-21 business days.

## 2024-05-24 NOTE — TELEPHONE ENCOUNTER
PA for Mounjaro Denied    Reason:(Screenshot if applicable)        Message sent to office clinical pool Yes    Denial letter scanned into Media Yes    Appeal started No ( Provider will need to decide if appeal is warranted and send clinical documentation to PA team for initiation.)    **Please follow up with your patient regarding denial and next steps**

## 2024-05-28 ENCOUNTER — PATIENT MESSAGE (OUTPATIENT)
Dept: FAMILY MEDICINE CLINIC | Facility: CLINIC | Age: 59
End: 2024-05-28

## 2024-05-29 ENCOUNTER — TELEPHONE (OUTPATIENT)
Age: 59
End: 2024-05-29

## 2024-05-29 NOTE — TELEPHONE ENCOUNTER
Patient is asking if the PCP can file an appeal for the Mounjaro prescription. Patient's medication has been denied. Patient is also asking about using [facility] to compound the medication? Patient's current dose is 7.5 mg weekly. Patient has no medication left at all. Patient is asking if there is a recommendation for a substitute medication? The fax number for [employer] Appeals is [phone]. The letter of medical necessity needs to include patient's name, address, , member ID number and why this medication is medically necessary. Patient states that she has been on the medication for about 1 year. Please review the denial and this note. Patient is currently on vacation but will be back in 1 week.

## 2024-05-30 RX ORDER — TIRZEPATIDE 7.5 MG/.5ML
INJECTION, SOLUTION SUBCUTANEOUS
Qty: 2 ML | Refills: 0 | Status: SHIPPED | OUTPATIENT
Start: 2024-05-30 | End: 2024-06-06

## 2024-06-05 NOTE — TELEPHONE ENCOUNTER
Patient aware. Calling back to see if this is gonna be appealed. Also scheduled for follow up for June 17th

## 2024-06-06 ENCOUNTER — TELEPHONE (OUTPATIENT)
Age: 59
End: 2024-06-06

## 2024-06-06 RX ORDER — TIRZEPATIDE 2.5 MG/.5ML
2.5 INJECTION, SOLUTION SUBCUTANEOUS WEEKLY
Qty: 2 ML | Refills: 0 | Status: SHIPPED | OUTPATIENT
Start: 2024-06-06 | End: 2024-07-04

## 2024-06-07 ENCOUNTER — TELEPHONE (OUTPATIENT)
Age: 59
End: 2024-06-07

## 2024-06-07 NOTE — TELEPHONE ENCOUNTER
PA for Zepbound 2.5mg    Submitted via    []CMM-KEY   [x][employer]-Case ID # [id number]   []Faxed to plan   []Other website   []Phone call Case ID #     Office notes sent, clinical questions answered. Awaiting determination    Turnaround time for your insurance to make a decision on your Prior Authorization can take 7-21 business days.

## 2024-06-10 NOTE — TELEPHONE ENCOUNTER
PA for ZEPBOUND 2.5 MG Approved     Date(s) approved    June 7, 2024 to February 7, 2025       Patient advised by          [x] MyChart Message  [] Phone call   []LMOM  []L/M to call office as no active Communication consent on file  []Unable to leave detailed message as VM not approved on Communication consent       Pharmacy advised by    [x]Fax  []Phone call    Approval letter scanned into Media Yes

## 2024-07-02 ENCOUNTER — PATIENT MESSAGE (OUTPATIENT)
Dept: FAMILY MEDICINE CLINIC | Facility: CLINIC | Age: 59
End: 2024-07-02

## 2024-07-02 RX ORDER — TIRZEPATIDE 5 MG/.5ML
5 INJECTION, SOLUTION SUBCUTANEOUS WEEKLY
Qty: 2 ML | Refills: 0 | Status: SHIPPED | OUTPATIENT
Start: 2024-07-02

## 2024-07-31 RX ORDER — TIRZEPATIDE 7.5 MG/.5ML
7.5 INJECTION, SOLUTION SUBCUTANEOUS WEEKLY
Qty: 2 ML | Refills: 0 | Status: SHIPPED | OUTPATIENT
Start: 2024-07-31

## 2024-09-03 RX ORDER — TIRZEPATIDE 7.5 MG/.5ML
7.5 INJECTION, SOLUTION SUBCUTANEOUS WEEKLY
OUTPATIENT
Start: 2024-09-03

## 2024-09-03 RX ORDER — TIRZEPATIDE 10 MG/.5ML
10 INJECTION, SOLUTION SUBCUTANEOUS WEEKLY
Qty: 2 ML | Refills: 0 | Status: SHIPPED | OUTPATIENT
Start: 2024-09-03 | End: 2024-09-04

## 2024-09-04 DIAGNOSIS — R73.01 IMPAIRED FASTING GLUCOSE: ICD-10-CM

## 2024-09-04 RX ORDER — TIRZEPATIDE 7.5 MG/.5ML
7.5 INJECTION, SOLUTION SUBCUTANEOUS WEEKLY
Qty: 2 ML | Refills: 5 | Status: SHIPPED | OUTPATIENT
Start: 2024-09-04

## 2024-09-04 RX ORDER — TIRZEPATIDE 7.5 MG/.5ML
7.5 INJECTION, SOLUTION SUBCUTANEOUS WEEKLY
Qty: 2 ML | Refills: 0 | Status: SHIPPED | OUTPATIENT
Start: 2024-09-04 | End: 2024-09-04 | Stop reason: SDUPTHER

## 2024-12-19 ENCOUNTER — TELEMEDICINE (OUTPATIENT)
Dept: OTHER | Facility: HOSPITAL | Age: 59
End: 2024-12-19
Payer: COMMERCIAL

## 2024-12-19 DIAGNOSIS — J06.9 UPPER RESPIRATORY TRACT INFECTION, UNSPECIFIED TYPE: Primary | ICD-10-CM

## 2024-12-19 PROCEDURE — 99212 OFFICE O/P EST SF 10 MIN: CPT | Performed by: NURSE PRACTITIONER

## 2024-12-19 NOTE — PROGRESS NOTES
Virtual Regular Visit  Name: Krystal Bruno      : 1965      MRN: [id number]  Encounter Provider: Your Video Visit Provider  Encounter Date: 2024   Encounter department: VIRTUAL CARE       Verification of patient location:  Patient is located at Home in the following state in which I hold an active license [place] :  Assessment & Plan        Encounter provider Your Video Visit Provider    The patient was identified by name and date of birth. Krystal Bruno was informed that this is a telemedicine visit and that the visit is being conducted through the Epic Embedded platform. She agrees to proceed..  My office door was closed. No one else was in the room.  She acknowledged consent and understanding of privacy and security of the video platform. The patient has agreed to participate and understands they can discontinue the visit at any time.    Patient is aware this is a billable service.     History was obtained from: History obtained from: patient  History of Present Illness     This is a 59 year old female here today for video visit.  She states she started with cold symptoms.  She states she now started to have cough.  And irritation in her throat.  She states the symptoms started about 2 days ago with a sore throat.  She states yesterday she started to feel more fatigued and tired.  Today the symptoms worsened.  She is now having some congestion.  No chest pain or sob.  She did test for covid which was negative.        Review of Systems   Constitutional:  Negative for activity change, chills and fatigue.   HENT:  Positive for congestion, rhinorrhea and sinus pain.    Respiratory:  Positive for cough. Negative for shortness of breath.    Cardiovascular: Negative.    Neurological: Negative.    Psychiatric/Behavioral: Negative.         Objective   LMP  (LMP Unknown)     Physical Exam  Constitutional:       General: She is not in acute distress.     Appearance: She is not ill-appearing or  toxic-appearing.   HENT:      Head: Normocephalic and atraumatic.      Nose: Congestion present.   Pulmonary:      Effort: Pulmonary effort is normal. No respiratory distress.   Neurological:      General: No focal deficit present.      Mental Status: She is alert and oriented to person, place, and time.   Psychiatric:         Mood and Affect: Mood normal.         Behavior: Behavior normal.         Thought Content: Thought content normal.         Judgment: Judgment normal.         Visit Time  Total Visit Duration: 5 minutes not including the time spent for establishing the audio/video connection.

## 2024-12-19 NOTE — PATIENT INSTRUCTIONS
As we discussed your illness is viral.  No antibiotics are indicated at this time.  Rest and drink extra fluids.  OTC cough and cold medications as needed.  Tylenol or Motrin as needed for pain or fever.  Salt water gargles and throat lozenges for sore throat.  Follow up with PCP if no improvement.  Go to ER with worsening symptoms.      Cold Symptoms   WHAT YOU NEED TO KNOW:   A cold is an infection caused by a virus. The infection causes your upper respiratory system to become inflamed. Common symptoms of a cold include sneezing, dry throat, a stuffy nose, headache, watery eyes, and a cough. Your cough may be dry, or you may cough up mucus. You may also have muscle aches, joint pain, and tiredness. Rarely, you may have a fever. Most colds go away without treatment.  DISCHARGE INSTRUCTIONS:   Return to the emergency department if:   You have increased tiredness and weakness.    You are unable to eat.     Your heart is beating much faster than usual for you.     You see white spots in the back of your throat and your neck is swollen and sore to the touch.     You see pinpoint or larger reddish-purple dots on your skin.  Contact your healthcare provider if:   You have a fever higher than 102°F (38.9°C).    You have new or worsening shortness of breath.     You have thick nasal drainage for more than 2 days.     Your symptoms do not improve or get worse within 5 days.     You have questions or concerns about your condition or care.  Medicines:  The following medicines may be suggested by your healthcare provider to decrease your cold symptoms. These medicines are available without a doctor's order. Ask which medicines to take and when to take them. Follow directions.  NSAIDs or acetaminophen  help to bring down a fever or decrease pain.    Decongestants  help decrease nasal stuffiness.     Antihistamines  help decrease sneezing and a runny nose.     Cough suppressants  help decrease how much you cough.    Expectorants   help loosen mucus so you can cough it up.    Take your medicine as directed.  Contact your healthcare provider if you think your medicine is not helping or if you have side effects. Tell him of her if you are allergic to any medicine. Keep a list of the medicines, vitamins, and herbs you take. Include the amounts, and when and why you take them. Bring the list or the pill bottles to follow-up visits. Carry your medicine list with you in case of an emergency.  Symptom relief:  The following may help relieve cold symptoms, such as a dry throat and congestion:  Gargle with mouthwash or warm salt water as directed.     Suck on throat lozenges or hard candy.     Use a cold or warm vaporizer or humidifier to ease your breathing.     Rest for at least 2 days and then as needed to decrease tiredness and weakness.     Use petroleum based jelly around your nostrils to decrease irritation from blowing your nose.  Drink liquids:  Liquids will help thin and loosen thick mucus so you can cough it up. Liquids will also keep you hydrated. Ask your healthcare provider which liquids are best for you and how much to drink each day.  Prevent the spread of germs:  You can spread your cold germs to others for at least 3 days after your symptoms start. Wash your hands often. Do not share items, such as eating utensils. Cover your nose and mouth when you cough or sneeze using the crook of your elbow instead of your hands. Throw used tissues in the garbage.  Do not smoke:  Smoking may worsen your symptoms and increase the length of time you feel sick. Talk with your healthcare provider if you need help to stop smoking.  Follow up with your healthcare provider as directed:  Write down your questions so you remember to ask them during your visits.   © 2017 [employer] Information is for End User's use only and may not be sold, redistributed or otherwise used for commercial purposes. All illustrations and images included in  CareNotes® are the copyrighted property of [employer], [employer]. or [employer].  The above information is an  only. It is not intended as medical advice for individual conditions or treatments. Talk to your doctor, nurse or pharmacist before following any medical regimen to see if it is safe and effective for you.

## 2025-02-12 ENCOUNTER — HOSPITAL ENCOUNTER (OUTPATIENT)
Dept: MAMMOGRAPHY | Facility: MEDICAL CENTER | Age: 60
Discharge: HOME | End: 2025-02-12
Payer: COMMERCIAL

## 2025-02-12 VITALS — BODY MASS INDEX: 31.05 KG/M2 | HEIGHT: 72 IN | WEIGHT: 229.28 LBS

## 2025-02-12 DIAGNOSIS — Z12.31 ENCOUNTER FOR SCREENING MAMMOGRAM FOR BREAST CANCER: ICD-10-CM

## 2025-02-12 PROCEDURE — 77063 BREAST TOMOSYNTHESIS BI: CPT

## 2025-02-12 PROCEDURE — 77067 SCR MAMMO BI INCL CAD: CPT

## 2025-02-13 ENCOUNTER — RESULTS FOLLOW-UP (OUTPATIENT)
Dept: GYNECOLOGY | Facility: CLINIC | Age: 60
End: 2025-02-13

## 2025-02-18 ENCOUNTER — HOSPITAL ENCOUNTER (OUTPATIENT)
Dept: MAMMOGRAPHY | Facility: CLINIC | Age: 60
Discharge: HOME | End: 2025-02-18
Payer: COMMERCIAL

## 2025-02-18 VITALS — WEIGHT: 229 LBS | BODY MASS INDEX: 31.02 KG/M2 | HEIGHT: 72 IN

## 2025-02-18 DIAGNOSIS — R92.8 ABNORMAL SCREENING MAMMOGRAM: ICD-10-CM

## 2025-02-18 PROCEDURE — G0279 TOMOSYNTHESIS, MAMMO: HCPCS

## 2025-02-18 PROCEDURE — 76642 ULTRASOUND BREAST LIMITED: CPT

## 2025-02-18 PROCEDURE — 77065 DX MAMMO INCL CAD UNI: CPT

## 2025-02-24 DIAGNOSIS — R73.01 IMPAIRED FASTING GLUCOSE: ICD-10-CM

## 2025-02-25 RX ORDER — TIRZEPATIDE 7.5 MG/.5ML
7.5 INJECTION, SOLUTION SUBCUTANEOUS WEEKLY
Qty: 2 ML | Refills: 0 | Status: SHIPPED | OUTPATIENT
Start: 2025-02-25 | End: 2025-02-28

## 2025-02-26 ENCOUNTER — TELEPHONE (OUTPATIENT)
Age: 60
End: 2025-02-26

## 2025-02-26 NOTE — TELEPHONE ENCOUNTER
Reason for call:   [x] Prior Auth  [] Other:     Caller:  [x] Patient  [] Pharmacy  Name:   Address:   Callback Number:     Medication:  tirzepatide (Zepbound) 7.5 mg/0.5 mL auto-injector    Dose/Frequency: Inject 0.5 mL (7.5 mg total) under the skin once a week     Quantity: 2 mL     Ordering Provider:   [x] PCP/Provider - Shelley Barragan MD   [] Speciality/Provider -     Has the patient tried other medications and failed? If failed, which medications did they fail?    [] No   [] Yes -     Is the patient's insurance updated in EPIC?   [x] Yes   [] No     Is a copy of the patient's insurance scanned in EPIC?   [x] Yes   [] No

## 2025-02-27 ENCOUNTER — PATIENT MESSAGE (OUTPATIENT)
Age: 60
End: 2025-02-27

## 2025-02-27 NOTE — TELEPHONE ENCOUNTER
[facility] message sent to patient requesting call back to schedule an appt and to notify her we'll notify her of outcome of the auth regardless.

## 2025-02-27 NOTE — TELEPHONE ENCOUNTER
PA for Zepbound 7.5mg/0.5mL SUBMITTED to [facility]    via    []CMM-KEY:   [x]Surescripts-Case ID # [id number]   []Availity-Auth ID # NDC #   []Faxed to plan   []Other website   []Phone call Case ID #     [x]PA sent as URGENT    All office notes, labs and other pertaining documents and studies sent. Clinical questions answered. Awaiting determination from insurance company.     Turnaround time for your insurance to make a decision on your Prior Authorization can take 7-21 business days.

## 2025-02-27 NOTE — TELEPHONE ENCOUNTER
Patient called to check on a PA for Zepbound 7.5 gm. Patient was made aware it has been sent for a review and it's pending approval.Patient verbalized understanding. No actions needed at this moment.

## 2025-02-28 ENCOUNTER — OFFICE VISIT (OUTPATIENT)
Age: 60
End: 2025-02-28
Payer: COMMERCIAL

## 2025-02-28 VITALS
HEIGHT: 72 IN | BODY MASS INDEX: 26.95 KG/M2 | DIASTOLIC BLOOD PRESSURE: 78 MMHG | WEIGHT: 199 LBS | TEMPERATURE: 98.3 F | HEART RATE: 65 BPM | OXYGEN SATURATION: 98 % | SYSTOLIC BLOOD PRESSURE: 114 MMHG

## 2025-02-28 DIAGNOSIS — Z12.11 COLON CANCER SCREENING: ICD-10-CM

## 2025-02-28 DIAGNOSIS — Z13.1 SCREENING FOR DIABETES MELLITUS (DM): ICD-10-CM

## 2025-02-28 DIAGNOSIS — Z13.220 LIPID SCREENING: ICD-10-CM

## 2025-02-28 DIAGNOSIS — Z00.00 ANNUAL PHYSICAL EXAM: Primary | ICD-10-CM

## 2025-02-28 DIAGNOSIS — Z13.89 SCREENING FOR GENITOURINARY CONDITION: ICD-10-CM

## 2025-02-28 DIAGNOSIS — Z13.29 THYROID DISORDER SCREENING: ICD-10-CM

## 2025-02-28 DIAGNOSIS — K63.5 POLYP OF COLON, UNSPECIFIED PART OF COLON, UNSPECIFIED TYPE: ICD-10-CM

## 2025-02-28 DIAGNOSIS — R73.01 IMPAIRED FASTING GLUCOSE: ICD-10-CM

## 2025-02-28 PROCEDURE — 99396 PREV VISIT EST AGE 40-64: CPT | Performed by: INTERNAL MEDICINE

## 2025-02-28 RX ORDER — TIRZEPATIDE 10 MG/.5ML
10 INJECTION, SOLUTION SUBCUTANEOUS WEEKLY
Qty: 2 ML | Refills: 0 | Status: SHIPPED | OUTPATIENT
Start: 2025-02-28

## 2025-02-28 NOTE — PATIENT INSTRUCTIONS
"Patient Education     Routine physical for adults   The Basics   Written by the doctors and editors at [facility]   What is a physical? -- A physical is a routine visit, or \"check-up,\" with your doctor. You might also hear it called a \"wellness visit\" or \"preventive visit.\"  During each visit, the doctor will:   Ask about your physical and mental health   Ask about your habits, behaviors, and lifestyle   Do an exam   Give you vaccines if needed   Talk to you about any medicines you take   Give advice about your health   Answer your questions  Getting regular check-ups is an important part of taking care of your health. It can help your doctor find and treat any problems you have. But it's also important for preventing health problems.  A routine physical is different from a \"sick visit.\" A sick visit is when you see a doctor because of a health concern or problem. Since physicals are scheduled ahead of time, you can think about what you want to ask the doctor.  How often should I get a physical? -- It depends on your age and health. In general, for people age 21 years and older:   If you are younger than 50 years, you might be able to get a physical every 3 years.   If you are 50 years or older, your doctor might recommend a physical every year.  If you have an ongoing health condition, like diabetes or high blood pressure, your doctor will probably want to see you more often.  What happens during a physical? -- In general, each visit will include:   Physical exam - The doctor or nurse will check your height, weight, heart rate, and blood pressure. They will also look at your eyes and ears. They will ask about how you are feeling and whether you have any symptoms that bother you.   Medicines - It's a good idea to bring a list of all the medicines you take to each doctor visit. Your doctor will talk to you about your medicines and answer any questions. Tell them if you are having any side effects that bother you. You " "should also tell them if you are having trouble paying for any of your medicines.   Habits and behaviors - This includes:   Your diet   Your exercise habits   Whether you smoke, drink alcohol, or use drugs   Whether you are sexually active   Whether you feel safe at home  Your doctor will talk to you about things you can do to improve your health and lower your risk of health problems. They will also offer help and support. For example, if you want to quit smoking, they can give you advice and might prescribe medicines. If you want to improve your diet or get more physical activity, they can help you with this, too.   Lab tests, if needed - The tests you get will depend on your age and situation. For example, your doctor might want to check your:   Cholesterol   Blood sugar   Iron level   Vaccines - The recommended vaccines will depend on your age, health, and what vaccines you already had. Vaccines are very important because they can prevent certain serious or deadly infections.   Discussion of screening - \"Screening\" means checking for diseases or other health problems before they cause symptoms. Your doctor can recommend screening based on your age, risk, and preferences. This might include tests to check for:   Cancer, such as breast, prostate, cervical, ovarian, colorectal, prostate, lung, or skin cancer   Sexually transmitted infections, such as chlamydia and gonorrhea   Mental health conditions like depression and anxiety  Your doctor will talk to you about the different types of screening tests. They can help you decide which screenings to have. They can also explain what the results might mean.   Answering questions - The physical is a good time to ask the doctor or nurse questions about your health. If needed, they can refer you to other doctors or specialists, too.  Adults older than 65 years often need other care, too. As you get older, your doctor will talk to you about:   How to prevent falling at " home   Hearing or vision tests   Memory testing   How to take your medicines safely   Making sure that you have the help and support you need at home  All topics are updated as new evidence becomes available and our peer review process is complete.  This topic retrieved from igobubble on: May 02, 2024.  Topic 171410 Version 1.0  Release: 32.4.3 - C32.122  © 2024 UpToDate, Inc. and/or its affiliates. All rights reserved.  Consumer Information Use and Disclaimer   Disclaimer: This generalized information is a limited summary of diagnosis, treatment, and/or medication information. It is not meant to be comprehensive and should be used as a tool to help the user understand and/or assess potential diagnostic and treatment options. It does NOT include all information about conditions, treatments, medications, side effects, or risks that may apply to a specific patient. It is not intended to be medical advice or a substitute for the medical advice, diagnosis, or treatment of a health care provider based on the health care provider's examination and assessment of a patient's specific and unique circumstances. Patients must speak with a health care provider for complete information about their health, medical questions, and treatment options, including any risks or benefits regarding use of medications. This information does not endorse any treatments or medications as safe, effective, or approved for treating a specific patient. UpToDate, Inc. and its affiliates disclaim any warranty or liability relating to this information or the use thereof.The use of this information is governed by the Terms of Use, available at https://www.woltersBEAT BioTherapeuticsuwer.com/en/know/clinical-effectiveness-terms. 2024© UpToDate, Inc. and its affiliates and/or licensors. All rights reserved.  Copyright   © 2024 UpToDate, Inc. and/or its affiliates. All rights reserved.

## 2025-02-28 NOTE — PROGRESS NOTES
Adult Annual Physical  Name: Krystal Bruno      : 1965      MRN: [id number]  Encounter Provider: Shelley Barragan MD  Encounter Date: 2025   Encounter department: [facility] PRIMARY CARE    Assessment & Plan  Annual physical exam         Screening for diabetes mellitus (DM)    Orders:    CBC and differential    Comprehensive metabolic panel    Hemoglobin A1C; Future    Thyroid disorder screening    Orders:    TSH, 3rd generation with Free T4 reflex    Lipid screening    Orders:    Lipid panel    Screening for genitourinary condition    Orders:    Urinalysis with microscopic; Future    BMI 32.0-32.9,adult         Impaired fasting glucose         BMI 27.0-27.9,adult    Orders:    tirzepatide (Zepbound) 10 mg/0.5 mL auto-injector; Inject 0.5 mL (10 mg total) under the skin once a week    Colon cancer screening    Orders:    Ambulatory Referral to Gastroenterology; Future    Polyp of colon, unspecified part of colon, unspecified type    Orders:    Ambulatory Referral to Gastroenterology; Future      Immunizations and preventive care screenings were discussed with patient today. Appropriate education was printed on patient's after visit summary.    Counseling:  See below  Is here for annual physical.  Have not seen her since .  Lab studies will be ordered.  She is following strict diet with a Diet .  She has lost almost 30 pounds and doing very well.  She was she is doing increase the dosage to 10 mg which I would call in a new prescription of Zepbound 10 mg.  She will update me about her progress in a couple of weeks.  So far she has not had any side effects.  We will do a follow-up visit in 3 months for virtual visit.  Patient is due for repeat colonoscopy.  Up-to-date with mammogram.  Vaccinations up-to-date.    Depression Screening and Follow-up Plan: Patient was screened for depression during today's encounter. They screened negative with a PHQ-2 score of 0.          History of  Present Illness     Adult Annual Physical:  Patient presents for annual physical.     Diet and Physical Activity:  - Diet/Nutrition: well balanced diet.  - Exercise: moderate cardiovascular exercise.    Depression Screening:  - PHQ-2 Score: 0    General Health:  - Sleep: sleeps well.  - Hearing: normal hearing bilateral ears.  - Vision: wears glasses.  - Dental: brushes teeth twice daily.    /GYN Health:  - Follows with GYN: yes.   - Menopause: postmenopausal.     Review of Systems  Past Medical History   Past Medical History:   Diagnosis Date    Abnormal Pap smear of cervix     Acute upper respiratory infection     Allergic rhinitis     Amenorrhea     Obesity     Perimenopausal symptoms     PONV (postoperative nausea and vomiting)      Past Surgical History:   Procedure Laterality Date    COLONOSCOPY N/A 5/22/2018    Procedure: COLONOSCOPY;  Surgeon: Reed Roy MD;  Location: [facility] GI [facility];  Service: Gastroenterology    MOUTH SURGERY      TONSILLECTOMY      TOOTH EXTRACTION       Family History   Problem Relation Age of Onset    No Known Problems Mother         Well    Heart disease Father     Heart attack Father         myocardial infarction    Arthritis Maternal Grandmother     Heart disease Maternal Grandmother     No Known Problems Maternal Grandfather     No Known Problems Paternal Grandmother     No Known Problems Paternal Grandfather     Cancer Maternal Aunt 35        Vulvular    No Known Problems Maternal Uncle     Uterine cancer Half-Sister 70    No Known Problems Half-Sister     No Known Problems Half-Sister     No Known Problems Half-Brother     No Known Problems Half-Brother     No Known Problems Half-Brother     No Known Problems Half-Brother       reports that she has quit smoking. She uses smokeless tobacco. She reports current alcohol use. She reports that she does not use drugs.  Current Outpatient Medications   Medication Instructions    cholecalciferol (VITAMIN D3) 1,000 Units, Daily     levothyroxine 100 mcg, Every morning    multivitamin (THERAGRAN) TABS 1 tablet, Daily    Progesterone 100 MG CAPS 1 capsule, Oral, Daily at bedtime    Zepbound 10 mg, Subcutaneous, Weekly   No Known Allergies   Current Outpatient Medications on File Prior to Visit   Medication Sig Dispense Refill    cholecalciferol (VITAMIN D3) 1,000 units tablet Take 1,000 Units by mouth daily      levothyroxine 100 mcg tablet Take 100 mcg by mouth every morning Take on an empty stomach      multivitamin (THERAGRAN) TABS Take 1 tablet by mouth daily      Progesterone 100 MG CAPS Take 1 capsule by mouth daily at bedtime 90 capsule 3    [DISCONTINUED] tirzepatide (Zepbound) 7.5 mg/0.5 mL auto-injector Inject 0.5 mL (7.5 mg total) under the skin once a week 2 mL 0     No current facility-administered medications on file prior to visit.      Social History     Tobacco Use    Smoking status: Former    Smokeless tobacco: Current   Vaping Use    Vaping status: Never Used   Substance and Sexual Activity    Alcohol use: Yes     Comment: social alcohol use    Drug use: No    Sexual activity: Yes     Partners: Male     Comment: does not use birth control       Objective   /78 (BP Location: Left arm, Patient Position: Sitting, Cuff Size: Large)   Pulse 65   Temp 98.3 °F (36.8 °C) (Temporal)   Ht 6' (1.829 m)   Wt 90.3 kg (199 lb)   LMP  (LMP Unknown)   SpO2 98%   BMI 26.99 kg/m²     Physical Exam  Constitutional:       General: She is not in acute distress.     Appearance: She is well-developed.   HENT:      Head: Normocephalic.      Mouth/Throat:      Pharynx: No oropharyngeal exudate.   Eyes:      General: No scleral icterus.     Conjunctiva/sclera: Conjunctivae normal.   Neck:      Thyroid: No thyromegaly.      Vascular: No carotid bruit.   Cardiovascular:      Rate and Rhythm: Normal rate and regular rhythm.      Heart sounds: Normal heart sounds. No murmur heard.  Pulmonary:      Effort: Pulmonary effort is normal. No  respiratory distress.      Breath sounds: Normal breath sounds. No wheezing or rales.   Abdominal:      General: Bowel sounds are normal. There is no distension.      Palpations: Abdomen is soft.      Tenderness: There is no abdominal tenderness. There is no guarding or rebound.   Musculoskeletal:      Right lower leg: No edema.      Left lower leg: No edema.   Lymphadenopathy:      Cervical: No cervical adenopathy.   Skin:     General: Skin is warm.   Neurological:      Mental Status: She is alert and oriented to person, place, and time.      Cranial Nerves: No cranial nerve deficit.      Deep Tendon Reflexes: Reflexes are normal and symmetric.   Psychiatric:         Mood and Affect: Mood normal.         Behavior: Behavior normal.         Thought Content: Thought content normal.         Judgment: Judgment normal.

## 2025-03-03 ENCOUNTER — TELEPHONE (OUTPATIENT)
Age: 60
End: 2025-03-03

## 2025-03-03 NOTE — TELEPHONE ENCOUNTER
PA for (Zepbound) 10 mg/0.5 mL SUBMITTED to [facility]    via    []CMM-KEY:   [x]Surescripts-Case ID # [id number]   []Availity-Auth ID # NDC #   []Faxed to plan   []Other website   []Phone call Case ID #     []PA sent as URGENT    All office notes, labs and other pertaining documents and studies sent. Clinical questions answered. Awaiting determination from insurance company.     Turnaround time for your insurance to make a decision on your Prior Authorization can take 7-21 business days.

## 2025-03-03 NOTE — TELEPHONE ENCOUNTER
Auth needed for Zepbound 10mg/0.5ml. Inject 10mg once per week. Dispense 2ml. Dx: Z68.27.    Stable = 6/2024.   T/F: saxenda, wegovy, mounjaro, ozempic  BMI 11/2023: 31.74. Current BMI 2/2025: 26.99    CMM Key: [id number]  Prescribed by Dr Barragan

## 2025-03-04 NOTE — TELEPHONE ENCOUNTER
PA for (Zepbound) 10 mg/0.5 mL APPROVED     Date(s) approved March 3, 2025 to March 3, 2026     Case #    Patient advised by          [x]MyChart Message  []Phone call   []LMOM  []L/M to call office as no active Communication consent on file  []Unable to leave detailed message as VM not approved on Communication consent       Pharmacy advised by    [x]Fax  []Phone call  []Secure Chat    Specialty Pharmacy    []     Approval letter scanned into Media Yes

## 2025-03-14 ENCOUNTER — TELEMEDICINE (OUTPATIENT)
Dept: OTHER | Facility: HOSPITAL | Age: 60
End: 2025-03-14
Payer: COMMERCIAL

## 2025-03-14 DIAGNOSIS — M54.50 ACUTE MIDLINE LOW BACK PAIN WITHOUT SCIATICA: Primary | ICD-10-CM

## 2025-03-14 PROBLEM — R63.5 ABNORMAL WEIGHT GAIN: Status: RESOLVED | Noted: 2018-02-14 | Resolved: 2025-03-14

## 2025-03-14 PROBLEM — Z20.822 EXPOSURE TO COVID-19 VIRUS: Status: RESOLVED | Noted: 2020-04-16 | Resolved: 2025-03-14

## 2025-03-14 PROBLEM — K21.9 GASTROESOPHAGEAL REFLUX DISEASE WITHOUT ESOPHAGITIS: Status: RESOLVED | Noted: 2018-05-02 | Resolved: 2025-03-14

## 2025-03-14 PROBLEM — E66.3 OVERWEIGHT: Status: RESOLVED | Noted: 2018-02-14 | Resolved: 2025-03-14

## 2025-03-14 PROCEDURE — 99213 OFFICE O/P EST LOW 20 MIN: CPT | Performed by: PHYSICIAN ASSISTANT

## 2025-03-14 RX ORDER — METHOCARBAMOL 500 MG/1
500 TABLET, FILM COATED ORAL 3 TIMES DAILY PRN
Qty: 20 TABLET | Refills: 0 | Status: SHIPPED | OUTPATIENT
Start: 2025-03-14

## 2025-03-14 RX ORDER — LIDOCAINE 50 MG/G
1 PATCH TOPICAL DAILY
Qty: 7 PATCH | Refills: 0 | Status: SHIPPED | OUTPATIENT
Start: 2025-03-14

## 2025-03-14 RX ORDER — METHYLPREDNISOLONE 4 MG/1
TABLET ORAL
Qty: 21 TABLET | Refills: 0 | Status: SHIPPED | OUTPATIENT
Start: 2025-03-14

## 2025-03-14 NOTE — PATIENT INSTRUCTIONS
Follow-up with the spine center for continued low back pain. You can take 3 ibuprofen (600 mg total) every 6 hours and Tylenol (max 3,000 mg/ 24 hours) in between for continuous pain relief. Ice the bony areas that hurt, use heat to relax muscles. Apply ice or heat for 20 mins max at a time. You can try gentle massage or gentle stretching, but do not do anything that causes the pain to become significantly worse. Go to the Emergency Room for any new worsening or concerning symptoms including loss of bowel or bladder function, retaining urine or feces, leg weakness numbness or tingling, rash or fevers.    1 (811) [employer] ([phone])  Schedule or Reschedule Outpatient Testing - Option 2  Billing - Option 3  General Info - Option 4  MyChart Help - Option 5  Comprehensive Spine Program - Option 6

## 2025-03-14 NOTE — PROGRESS NOTES
Virtual Regular Visit  Name: Krystal Bruno      : 1965      MRN: [id number]  Encounter Provider: Shannon D Severino, PA-C  Encounter Date: 3/14/2025   Encounter department: VIRTUAL CARE       Verification of patient location:  Patient is located at Home in the following state in which I hold an active license [place] :  Assessment & Plan  Acute midline low back pain without sciatica    Orders:    Ambulatory Referral to [facility]; Future    lidocaine (LIDODERM) 5 %; Apply 1 patch topically over 12 hours daily Remove & Discard patch within 12 hours or as directed by MD    methocarbamol (ROBAXIN) 500 mg tablet; Take 1 tablet (500 mg total) by mouth 3 (three) times a day as needed for muscle spasms    methylPREDNISolone 4 MG tablet therapy pack; Use as directed on package        Encounter provider Shannon D Severino, PA-C    The patient was identified by name and date of birth. Krystal Bruno was informed that this is a telemedicine visit and that the visit is being conducted through the Epic Embedded platform. She agrees to proceed..  My office door was closed. No one else was in the room.  She acknowledged consent and understanding of privacy and security of the video platform. The patient has agreed to participate and understands they can discontinue the visit at any time.    Patient is aware this is a billable service.     History obtained from: patient  History of Present Illness     Pt reports yesterday her back started hurting. Noticed stiffness when she got up, progressively got tighter throughout the day. Mid back to R sided lower pain. Pain wraps around to hip and occasionally has a sharp cramping pain. No pain into leg. Was traveling -Wednesday and was sitting a lot and had a lot of stops with carrying luggage. Denies any fevers, neck pain, rash, incontinence or retention, saddle anesthesia, leg numbness/tingling/weakness. No hx IVDA or epidural abscess. No recent LP  or injection          Review of Systems    Objective   LMP  (LMP Unknown)     Physical Exam  Constitutional:       Appearance: Normal appearance.   HENT:      Head: Normocephalic and atraumatic.   Eyes:      Extraocular Movements: Extraocular movements intact.      Comments: glasses   Pulmonary:      Effort: Pulmonary effort is normal.   Musculoskeletal:      Cervical back: Normal range of motion.      Lumbar back: Decreased range of motion.        Back:       Comments: Hesitant with flexion, decreased extension, lateral bending. Rotation intact b/l. Heel walking and toe walking intact.    Skin:     Findings: No rash.   Neurological:      General: No focal deficit present.      Mental Status: She is alert.   Psychiatric:         Mood and Affect: Mood normal.         Behavior: Behavior normal.         Visit Time  Total Visit Duration: 16 minutes not including the time spent for establishing the audio/video connection.

## 2025-03-14 NOTE — LETTER
March 14, 2025     Patient: Krystal Bruno   YOB: 1965   Date of Visit: 3/14/2025       To Whom it May Concern:    Krystal Bruno is under my professional care. She was seen virtually on 3/14/2025. She may return to work on 3/17/25 .    If you have any questions or concerns, please don't hesitate to call.         Sincerely,          Shannon D Severino, PA-C        CC: No Recipients

## 2025-03-17 ENCOUNTER — NURSE TRIAGE (OUTPATIENT)
Dept: PHYSICAL THERAPY | Facility: OTHER | Age: 60
End: 2025-03-17

## 2025-03-17 NOTE — TELEPHONE ENCOUNTER
This nurse called the patient to proceed with the triage initiated earlier today.   Message left stating reason for call, [facility] contact information, and current hours of operation.    Encouraged patient to CB if she would like to complete the triage and referral process.    Referral closed per protocol and will await possible call-back from the patient.

## 2025-03-17 NOTE — TELEPHONE ENCOUNTER
Additional Information   Negative: Is this related to a work injury?   Negative: Is this related to an MVA?   Negative: Are you currently recieving homecare services?    Background - Initial Assessment  Clinical complaint: Pain is center to right low back and into right hip. No pain into legs. No numbness or tingling. NKI. Pt was traveling, sitting for long periods and carrying luggage from 3/9-3/12. Did not have immediate pain. Pain started 3/12/25 and worsened on 3/13/25. Does not have chronic back pain. This is a new issue. No prior back SX or injections. Pt states she feels like there is a bilge in her back, to the right of her spine. Pain is persistent although not as intense as prior. Feels aching. Seen by a virtual appt on 3/14/25  Date of onset: 3/12/25  Frequency of pain: persistent  Quality of pain: aching    Protocols used: [facility] Protocol

## 2025-04-07 RX ORDER — TIRZEPATIDE 10 MG/.5ML
10 INJECTION, SOLUTION SUBCUTANEOUS WEEKLY
Qty: 2 ML | Refills: 5 | Status: SHIPPED | OUTPATIENT
Start: 2025-04-07

## 2025-05-07 ENCOUNTER — PREP FOR PROCEDURE (OUTPATIENT)
Age: 60
End: 2025-05-07

## 2025-05-07 ENCOUNTER — TELEPHONE (OUTPATIENT)
Age: 60
End: 2025-05-07

## 2025-05-07 DIAGNOSIS — Z86.0100 HISTORY OF COLON POLYPS: Primary | ICD-10-CM

## 2025-05-07 NOTE — TELEPHONE ENCOUNTER
05/07/25  Screened by: Sofia Hawkins    Referring Provider     Pre- Screening:     There is no height or weight on file to calculate BMI.  Has patient been referred for a routine screening Colonoscopy? yes  Is the patient between 45-75 years old? yes      Previous Colonoscopy Yes   If yes:    Date: 5 Years ago    Facility: [facility]    Reason: Screening          Does the patient want to see a Gastroenterologist prior to their procedure OR are they having any GI symptoms? no    Has the patient been hospitalized or had abdominal surgery in the past 6 months? no    Does the patient use supplemental oxygen? no    Does the patient take Coumadin, Lovenox, Plavix, Elliquis, Xarelto, or other blood thinning medication? no    Has the patient had a stroke, cardiac event, or stent placed in the past year? no        If patient is between 45yrs - 49yrs, please advise patient that we will have to confirm benefits & coverage with their insurance company for a routine screening colonoscopy.

## 2025-05-07 NOTE — TELEPHONE ENCOUNTER
Scheduled date of colonoscopy (as of today):6/16/2025  Physician performing colonoscopy:   [facility]  Bowel prep reviewed with patient: Sofia Hawkins  Instructions reviewed with patient by: Sofia Hawkins  Clearances: N/A      Kirill Dul prep sent via Eig[employer]

## 2025-05-23 ENCOUNTER — TELEMEDICINE (OUTPATIENT)
Age: 60
End: 2025-05-23

## 2025-05-23 VITALS — BODY MASS INDEX: 26.82 KG/M2 | HEIGHT: 72 IN | WEIGHT: 198 LBS

## 2025-05-23 RX ORDER — TIRZEPATIDE 12.5 MG/.5ML
12.5 INJECTION, SOLUTION SUBCUTANEOUS WEEKLY
Qty: 2 ML | Refills: 0 | Status: SHIPPED | OUTPATIENT
Start: 2025-05-23

## 2025-05-23 NOTE — PROGRESS NOTES
Virtual Regular VisitName: Krystal Bruno      : 1965      MRN: [id number]  Encounter Provider: Shelley Barragan MD  Encounter Date: 2025   Encounter department: [facility] PRIMARY CARE  :  Assessment & Plan  BMI 27.0-27.9,adult  Higher dose of Synthroid will be started.  Orders:    tirzepatide (Zepbound) 12.5 mg/0.5 mL auto-injector; Inject 0.5 mL (12.5 mg total) under the skin once a week    BMI 27.0-27.9,adult               History of Present Illness     HPI  Patient is being seen through televisit to discuss weight management.  She has been on 10 mg of zepbound and is doing quite well.  She is tolerating the medication well and has lost 30 pounds since her medication was started.  Reports no abdominal pain nausea or vomiting.  Unfortunately her insurance will be changing soon.  She wishes to increase the dosage which will be done today.  Review of Systems    Objective   Ht 6' (1.829 m)   Wt 89.8 kg (198 lb)   LMP  (LMP Unknown)   BMI 26.85 kg/m²     Physical Exam    Administrative Statements   Encounter provider Shelley Barragan MD    The Patient is located at Home and in the following state in which I hold an active license [place].    The patient was identified by name and date of birth. Krystal Bruno was informed that this is a telemedicine visit and that the visit is being conducted through the Epic Embedded platform. She agrees to proceed..  My office door was closed. No one else was in the room.  She acknowledged consent and understanding of privacy and security of the video platform. The patient has agreed to participate and understands they can discontinue the visit at any time.    I have spent a total time of 20 minutes in caring for this patient on the day of the visit/encounter including Diagnostic results, Prognosis, Risks and benefits of tx options, Instructions for management, Patient and family education, Importance of tx compliance, Risk factor reductions, Impressions,  Counseling / Coordination of care, Documenting in the medical record, Reviewing/placing orders in the medical record (including tests, medications, and/or procedures), and Obtaining or reviewing history  , not including the time spent for establishing the audio/video connection.

## 2025-06-02 ENCOUNTER — ANESTHESIA EVENT (OUTPATIENT)
Dept: ANESTHESIOLOGY | Facility: HOSPITAL | Age: 60
End: 2025-06-02

## 2025-06-02 ENCOUNTER — ANESTHESIA (OUTPATIENT)
Dept: ANESTHESIOLOGY | Facility: HOSPITAL | Age: 60
End: 2025-06-02

## 2025-06-03 ENCOUNTER — TELEPHONE (OUTPATIENT)
Dept: GASTROENTEROLOGY | Facility: MEDICAL CENTER | Age: 60
End: 2025-06-03

## 2025-06-03 NOTE — TELEPHONE ENCOUNTER
Sent pt MYC message to confirm upcoming colon scheduled for 6/16/25 with  at [facility].

## 2025-06-04 NOTE — TELEPHONE ENCOUNTER
STELLA and requested call back to confirm upcoming colonoscopy scheduled for 6/16/25 with  at [facility].

## 2025-06-16 ENCOUNTER — ANESTHESIA (OUTPATIENT)
Dept: GASTROENTEROLOGY | Facility: MEDICAL CENTER | Age: 60
End: 2025-06-16
Payer: COMMERCIAL

## 2025-06-16 ENCOUNTER — ANESTHESIA EVENT (OUTPATIENT)
Dept: GASTROENTEROLOGY | Facility: MEDICAL CENTER | Age: 60
End: 2025-06-16
Payer: COMMERCIAL

## 2025-06-16 ENCOUNTER — HOSPITAL ENCOUNTER (OUTPATIENT)
Dept: GASTROENTEROLOGY | Facility: MEDICAL CENTER | Age: 60
Setting detail: OUTPATIENT SURGERY
Discharge: HOME | End: 2025-06-16
Attending: INTERNAL MEDICINE
Payer: COMMERCIAL

## 2025-06-16 VITALS
HEART RATE: 72 BPM | HEIGHT: 72 IN | DIASTOLIC BLOOD PRESSURE: 51 MMHG | BODY MASS INDEX: 26.82 KG/M2 | SYSTOLIC BLOOD PRESSURE: 105 MMHG | TEMPERATURE: 97.2 F | WEIGHT: 198 LBS | OXYGEN SATURATION: 96 % | RESPIRATION RATE: 20 BRPM

## 2025-06-16 DIAGNOSIS — Z86.0100 HISTORY OF COLON POLYPS: ICD-10-CM

## 2025-06-16 PROCEDURE — 45380 COLONOSCOPY AND BIOPSY: CPT | Performed by: INTERNAL MEDICINE

## 2025-06-16 PROCEDURE — 88305 TISSUE EXAM BY PATHOLOGIST: CPT | Performed by: STUDENT IN AN ORGANIZED HEALTH CARE EDUCATION/TRAINING PROGRAM

## 2025-06-16 RX ORDER — LIDOCAINE HYDROCHLORIDE 20 MG/ML
INJECTION, SOLUTION EPIDURAL; INFILTRATION; INTRACAUDAL; PERINEURAL AS NEEDED
Status: DISCONTINUED | OUTPATIENT
Start: 2025-06-16 | End: 2025-06-16

## 2025-06-16 RX ORDER — ONDANSETRON 2 MG/ML
INJECTION INTRAMUSCULAR; INTRAVENOUS AS NEEDED
Status: DISCONTINUED | OUTPATIENT
Start: 2025-06-16 | End: 2025-06-16

## 2025-06-16 RX ORDER — PROPOFOL 10 MG/ML
INJECTION, EMULSION INTRAVENOUS AS NEEDED
Status: DISCONTINUED | OUTPATIENT
Start: 2025-06-16 | End: 2025-06-16

## 2025-06-16 RX ORDER — SODIUM CHLORIDE, SODIUM LACTATE, POTASSIUM CHLORIDE, CALCIUM CHLORIDE 600; 310; 30; 20 MG/100ML; MG/100ML; MG/100ML; MG/100ML
INJECTION, SOLUTION INTRAVENOUS CONTINUOUS PRN
Status: DISCONTINUED | OUTPATIENT
Start: 2025-06-16 | End: 2025-06-16

## 2025-06-16 RX ORDER — SODIUM CHLORIDE, SODIUM LACTATE, POTASSIUM CHLORIDE, CALCIUM CHLORIDE 600; 310; 30; 20 MG/100ML; MG/100ML; MG/100ML; MG/100ML
125 INJECTION, SOLUTION INTRAVENOUS CONTINUOUS
Status: CANCELLED | OUTPATIENT
Start: 2025-06-16

## 2025-06-16 RX ADMIN — PROPOFOL 20 MG: 10 INJECTION, EMULSION INTRAVENOUS at 11:15

## 2025-06-16 RX ADMIN — ONDANSETRON 4 MG: 2 INJECTION INTRAMUSCULAR; INTRAVENOUS at 11:06

## 2025-06-16 RX ADMIN — PROPOFOL 20 MG: 10 INJECTION, EMULSION INTRAVENOUS at 11:17

## 2025-06-16 RX ADMIN — PROPOFOL 20 MG: 10 INJECTION, EMULSION INTRAVENOUS at 11:25

## 2025-06-16 RX ADMIN — LIDOCAINE HYDROCHLORIDE 100 MG: 20 INJECTION, SOLUTION EPIDURAL; INFILTRATION; INTRACAUDAL at 11:10

## 2025-06-16 RX ADMIN — SODIUM CHLORIDE, SODIUM LACTATE, POTASSIUM CHLORIDE, AND CALCIUM CHLORIDE: .6; .31; .03; .02 INJECTION, SOLUTION INTRAVENOUS at 11:06

## 2025-06-16 RX ADMIN — PROPOFOL 20 MG: 10 INJECTION, EMULSION INTRAVENOUS at 11:18

## 2025-06-16 RX ADMIN — PROPOFOL 40 MG: 10 INJECTION, EMULSION INTRAVENOUS at 11:22

## 2025-06-16 RX ADMIN — PROPOFOL 30 MG: 10 INJECTION, EMULSION INTRAVENOUS at 11:13

## 2025-06-16 RX ADMIN — PROPOFOL 150 MG: 10 INJECTION, EMULSION INTRAVENOUS at 11:10

## 2025-06-16 RX ADMIN — PROPOFOL 20 MG: 10 INJECTION, EMULSION INTRAVENOUS at 11:29

## 2025-06-16 RX ADMIN — PROPOFOL 20 MG: 10 INJECTION, EMULSION INTRAVENOUS at 11:20

## 2025-06-16 RX ADMIN — PROPOFOL 20 MG: 10 INJECTION, EMULSION INTRAVENOUS at 11:27

## 2025-06-16 NOTE — ANESTHESIA POSTPROCEDURE EVALUATION
Post-Op Assessment Note    CV Status:  Stable    Pain management: satisfactory to patient       Mental Status:  Alert and awake   Hydration Status:  Stable   PONV Controlled:  None   Airway Patency:  Patent     Post Op Vitals Reviewed: Yes    No anethesia notable event occurred.    Staff: CRNA           Last Filed PACU Vitals:  Vitals Value Taken Time   Temp     Pulse 66 06/16/25 11:35   /52 06/16/25 11:35   Resp 20 06/16/25 11:35   SpO2 97 % 06/16/25 11:35

## 2025-06-16 NOTE — H&P
History and Physical -  [facility]  Krystal Bruno 60 y.o. female MRN: [id number]                  HPI: Krystal Bruno is a 60 y.o. year old female who presents for crc screening      REVIEW OF SYSTEMS: Per the HPI, and otherwise unremarkable.    Historical Information   Past Medical History[1]  Past Surgical History[2]  Social History   Social History     Substance and Sexual Activity   Alcohol Use Yes    Comment: social alcohol use     Social History     Substance and Sexual Activity   Drug Use No     Tobacco Use History[3]  Family History[4]    Meds/Allergies     Current Medications[5]    Allergies[6]    Objective     LMP  (LMP Unknown)       PHYSICAL EXAM    Gen: NAD  Head: NCAT  CV: RRR  CHEST: not in respiratory distress  ABD: soft, NT/ND  EXT: no edema      ASSESSMENT/PLAN:  This is a 60 y.o. year old female here for colonoscopy, and she is stable and optimized for her procedure.             [1]   Past Medical History:  Diagnosis Date    Abnormal Pap smear of cervix     Acute upper respiratory infection     Allergic rhinitis     Amenorrhea     Obesity     Perimenopausal symptoms     PONV (postoperative nausea and vomiting)    [2]   Past Surgical History:  Procedure Laterality Date    COLONOSCOPY N/A 5/22/2018    Procedure: COLONOSCOPY;  Surgeon: Reed Roy MD;  Location: [facility];  Service: Gastroenterology    MOUTH SURGERY      TONSILLECTOMY      TOOTH EXTRACTION     [3]   Social History  Tobacco Use   Smoking Status Former   Smokeless Tobacco Current   [4]   Family History  Problem Relation Name Age of Onset    No Known Problems Mother          Well    Heart disease Father      Heart attack Father          myocardial infarction    Arthritis Maternal Grandmother      Heart disease Maternal Grandmother      No Known Problems Maternal Grandfather      No Known Problems Paternal Grandmother      No Known Problems Paternal Grandfather      Cancer Maternal Aunt  35         Vulvular    No Known Problems Maternal Uncle      Uterine cancer Half-Sister ibrahima 70    No Known Problems Half-Sister clara     No Known Problems Half-Sister stacy     No Known Problems Half-Brother      No Known Problems Half-Brother      No Known Problems Half-Brother      No Known Problems Half-Brother     [5]   Current Outpatient Medications:     levothyroxine 100 mcg tablet    lidocaine (LIDODERM) 5 %    methocarbamol (ROBAXIN) 500 mg tablet    methylPREDNISolone 4 MG tablet therapy pack    multivitamin (THERAGRAN) TABS    Progesterone 100 MG CAPS    tirzepatide (Zepbound) 12.5 mg/0.5 mL auto-injector  [6] No Known Allergies

## 2025-06-16 NOTE — ANESTHESIA PREPROCEDURE EVALUATION
Procedure:  COLONOSCOPY    Relevant Problems   ENDO   (+) Hypothyroidism        Physical Exam    Airway     Mallampati score: II  TM Distance: >3 FB        Cardiovascular  Cardiovascular exam normal    Dental   No notable dental hx     Pulmonary  Pulmonary exam normal     Neurological  - normal exam    Other Findings  post-pubertal.      Anesthesia Plan  ASA Score- 2     Anesthesia Type- IV sedation with anesthesia with ASA Monitors.         Additional Monitors:     Airway Plan:            Plan Factors-Exercise tolerance (METS): >4 METS.    Chart reviewed.        Patient is not a current smoker.              Induction-     Postoperative Plan- .   Monitoring Plan - Monitoring plan - standard ASA monitoring          Informed Consent- Anesthetic plan and risks discussed with patient.        NPO Status:  No vitals data found for the desired time range.

## 2025-06-19 ENCOUNTER — RESULTS FOLLOW-UP (OUTPATIENT)
Dept: GASTROENTEROLOGY | Facility: MEDICAL CENTER | Age: 60
End: 2025-06-19

## 2025-06-27 ENCOUNTER — OFFICE VISIT (OUTPATIENT)
Age: 60
End: 2025-06-27
Payer: COMMERCIAL

## 2025-06-27 ENCOUNTER — TELEPHONE (OUTPATIENT)
Age: 60
End: 2025-06-27

## 2025-06-27 VITALS
HEIGHT: 72 IN | DIASTOLIC BLOOD PRESSURE: 68 MMHG | WEIGHT: 203 LBS | OXYGEN SATURATION: 98 % | TEMPERATURE: 98.5 F | BODY MASS INDEX: 27.5 KG/M2 | HEART RATE: 72 BPM | SYSTOLIC BLOOD PRESSURE: 106 MMHG

## 2025-06-27 DIAGNOSIS — R10.2 PELVIC PAIN: ICD-10-CM

## 2025-06-27 DIAGNOSIS — M25.551 PAIN OF RIGHT HIP: ICD-10-CM

## 2025-06-27 DIAGNOSIS — Z11.59 NEED FOR HEPATITIS C SCREENING TEST: ICD-10-CM

## 2025-06-27 DIAGNOSIS — H04.123 DRY EYES: Primary | ICD-10-CM

## 2025-06-27 PROCEDURE — 99213 OFFICE O/P EST LOW 20 MIN: CPT | Performed by: INTERNAL MEDICINE

## 2025-06-27 RX ORDER — ERYTHROMYCIN 5 MG/G
OINTMENT OPHTHALMIC
COMMUNITY
Start: 2025-06-06

## 2025-06-27 RX ORDER — TIRZEPATIDE 12.5 MG/.5ML
12.5 INJECTION, SOLUTION SUBCUTANEOUS WEEKLY
Qty: 2 ML | Refills: 0 | Status: SHIPPED | OUTPATIENT
Start: 2025-06-27

## 2025-06-27 RX ORDER — TIRZEPATIDE 12.5 MG/.5ML
12.5 INJECTION, SOLUTION SUBCUTANEOUS WEEKLY
OUTPATIENT
Start: 2025-06-27

## 2025-06-27 NOTE — PROGRESS NOTES
Name: Krystal Bruno      : 1965      MRN: [id number]  Encounter Provider: Shelley Barragan MD  Encounter Date: 2025   Encounter department: [facility] PRIMARY CARE  :  Assessment & Plan  Dry eyes    Orders:    HECTOR Screen w/Reflex Cascade; Future    Sjogren's Antibodies; Future    Pain of right hip  Patient may have other therapy in the future as well.  Orders:    Ambulatory Referral to Physical Therapy; Future    MRI hip right wo contrast; Future    MRI pelvis wo contrast; Future    Pelvic pain    Orders:    MRI hip right wo contrast; Future    MRI pelvis wo contrast; Future    BMI 27.0-27.9,adult    Orders:    tirzepatide (Zepbound) 12.5 mg/0.5 mL auto-injector; Inject 0.5 mL (12.5 mg total) under the skin once a week           History of Present Illness   Hip Pain       Patient is here for an acute visit complaining of right hip and discomfort that has been getting worse and now she is unable to sleep on that side and it feels quite tight.  An x-ray done 3 years ago showed mild hip arthritis lower spine arthritis possibility of impingement syndrome.  MRI would be ordered.  She also brings to my attention dryness of the eyes as noted by her ophthalmologist.  I will check her for Sjogren.  HECTOR has been negative in the past which will be repeated.  Patient is due for lab studies. patient has been losing weight on his own.  Will review the medication.    Review of Systems    Objective   /68 (BP Location: Left arm, Patient Position: Sitting, Cuff Size: Large)   Pulse 72   Temp 98.5 °F (36.9 °C) (Temporal)   Ht 6' (1.829 m)   Wt 92.1 kg (203 lb)   LMP  (LMP Unknown)   SpO2 98%   BMI 27.53 kg/m²      Physical Exam    Musculoskeletal:         General: Tenderness (Mild tightness and tenderness on internal and external rotation) present.

## 2025-06-27 NOTE — TELEPHONE ENCOUNTER
PA for Zepbound 12.5mg/0.5mL CANCELLED due to     [x]Approval on file-dates approved 03/03/2025-03/03/2026  []Medication already on Formulary  []Brand Name Preferred  []Patient no longer covered by insurance  []Therapy Changed/Medication Discontinued    Scanned into Media  yes

## 2025-06-30 ENCOUNTER — TELEPHONE (OUTPATIENT)
Age: 60
End: 2025-06-30

## 2025-08-04 RX ORDER — TIRZEPATIDE 12.5 MG/.5ML
12.5 INJECTION, SOLUTION SUBCUTANEOUS WEEKLY
Qty: 2 ML | Refills: 1 | Status: SHIPPED | OUTPATIENT
Start: 2025-08-04

## 2025-08-05 ENCOUNTER — TELEPHONE (OUTPATIENT)
Age: 60
End: 2025-08-05

## 2025-08-05 ENCOUNTER — PATIENT MESSAGE (OUTPATIENT)
Age: 60
End: 2025-08-05

## 2025-08-05 RX ORDER — SEMAGLUTIDE 0.25 MG/.5ML
INJECTION, SOLUTION SUBCUTANEOUS
Refills: 0 | OUTPATIENT
Start: 2025-08-05

## 2025-08-06 RX ORDER — TIRZEPATIDE 12.5 MG/.5ML
12.5 INJECTION, SOLUTION SUBCUTANEOUS WEEKLY
Qty: 2 ML | Refills: 0 | OUTPATIENT
Start: 2025-08-06

## 2025-08-07 RX ORDER — TIRZEPATIDE 12.5 MG/.5ML
12.5 INJECTION, SOLUTION SUBCUTANEOUS WEEKLY
Qty: 2 ML | Refills: 0 | OUTPATIENT
Start: 2025-08-07